# Patient Record
Sex: FEMALE | Race: WHITE | NOT HISPANIC OR LATINO | Employment: OTHER | URBAN - NONMETROPOLITAN AREA
[De-identification: names, ages, dates, MRNs, and addresses within clinical notes are randomized per-mention and may not be internally consistent; named-entity substitution may affect disease eponyms.]

---

## 2017-01-26 ENCOUNTER — TELEPHONE (OUTPATIENT)
Dept: OTOLARYNGOLOGY | Facility: CLINIC | Age: 82
End: 2017-01-26

## 2017-01-26 NOTE — TELEPHONE ENCOUNTER
Spoke with Ms. Treviño re: lost BAHA processor. Processor was fit in 2010 and may be eligible for replacement. Will submit paperwork to [employer] for upgrade.

## 2017-02-06 ENCOUNTER — TELEPHONE (OUTPATIENT)
Dept: OTOLARYNGOLOGY | Facility: CLINIC | Age: 82
End: 2017-02-06

## 2017-02-16 ENCOUNTER — TELEPHONE (OUTPATIENT)
Dept: OTOLARYNGOLOGY | Facility: CLINIC | Age: 82
End: 2017-02-16

## 2017-02-16 NOTE — TELEPHONE ENCOUNTER
Spoke with Ms. Treviño re: BAHA replacement. She needed information on clinic and audiologist to complete paperwork to send to [employer] for replacement processor.

## 2017-03-23 ENCOUNTER — TELEPHONE (OUTPATIENT)
Dept: OTOLARYNGOLOGY | Facility: CLINIC | Age: 82
End: 2017-03-23

## 2017-03-24 NOTE — TELEPHONE ENCOUNTER
Returned call and let her know we are still waiting on Medicare for approval. I will call her when replacement processor arrives.

## 2017-04-18 ENCOUNTER — PROCEDURE VISIT (OUTPATIENT)
Dept: OTOLARYNGOLOGY | Facility: CLINIC | Age: 82
End: 2017-04-18

## 2017-04-18 DIAGNOSIS — IMO0001 ASYMMETRICAL HEARING LOSS, LEFT: Primary | ICD-10-CM

## 2017-04-18 PROCEDURE — HEARINGNOCHG: Performed by: AUDIOLOGIST

## 2017-04-18 NOTE — PROGRESS NOTES
Ms. Treviño presented to the clinic this date for a BAHA fitting. She was fit with a BAHA 5 replacement .

## 2017-05-10 ENCOUNTER — OFFICE VISIT (OUTPATIENT)
Dept: CARDIOLOGY | Facility: CLINIC | Age: 82
End: 2017-05-10

## 2017-05-10 VITALS
HEIGHT: 65 IN | HEART RATE: 68 BPM | OXYGEN SATURATION: 98 % | WEIGHT: 177 LBS | SYSTOLIC BLOOD PRESSURE: 150 MMHG | BODY MASS INDEX: 29.49 KG/M2 | DIASTOLIC BLOOD PRESSURE: 70 MMHG

## 2017-05-10 DIAGNOSIS — I10 ESSENTIAL HYPERTENSION: ICD-10-CM

## 2017-05-10 DIAGNOSIS — E66.09 NON MORBID OBESITY DUE TO EXCESS CALORIES: ICD-10-CM

## 2017-05-10 DIAGNOSIS — R00.2 PALPITATIONS: ICD-10-CM

## 2017-05-10 DIAGNOSIS — I50.32 CHRONIC DIASTOLIC CONGESTIVE HEART FAILURE (HCC): Primary | ICD-10-CM

## 2017-05-10 PROCEDURE — 99214 OFFICE O/P EST MOD 30 MIN: CPT | Performed by: INTERNAL MEDICINE

## 2017-05-10 RX ORDER — CLOTRIMAZOLE AND BETAMETHASONE DIPROPIONATE 10; .64 MG/G; MG/G
1 CREAM TOPICAL AS NEEDED
COMMUNITY
Start: 2017-02-21 | End: 2017-09-18 | Stop reason: ALTCHOICE

## 2017-05-10 RX ORDER — LOSARTAN POTASSIUM 100 MG/1
100 TABLET ORAL DAILY
Qty: 90 TABLET | Refills: 30 | Status: SHIPPED | OUTPATIENT
Start: 2017-05-10 | End: 2018-06-25 | Stop reason: SDUPTHER

## 2017-05-10 RX ORDER — RANITIDINE 300 MG/1
300 TABLET ORAL NIGHTLY
COMMUNITY
Start: 2017-04-26 | End: 2018-09-20 | Stop reason: DRUGHIGH

## 2017-09-18 ENCOUNTER — OFFICE VISIT (OUTPATIENT)
Dept: CARDIOLOGY | Facility: CLINIC | Age: 82
End: 2017-09-18

## 2017-09-18 ENCOUNTER — TELEPHONE (OUTPATIENT)
Dept: CARDIOLOGY | Facility: CLINIC | Age: 82
End: 2017-09-18

## 2017-09-18 VITALS
DIASTOLIC BLOOD PRESSURE: 70 MMHG | BODY MASS INDEX: 28.16 KG/M2 | OXYGEN SATURATION: 98 % | WEIGHT: 169 LBS | SYSTOLIC BLOOD PRESSURE: 140 MMHG | HEIGHT: 65 IN | HEART RATE: 69 BPM

## 2017-09-18 DIAGNOSIS — I50.32 CHRONIC DIASTOLIC CONGESTIVE HEART FAILURE (HCC): ICD-10-CM

## 2017-09-18 DIAGNOSIS — I10 ESSENTIAL HYPERTENSION: ICD-10-CM

## 2017-09-18 DIAGNOSIS — R00.2 HEART PALPITATIONS: Primary | ICD-10-CM

## 2017-09-18 PROCEDURE — 99214 OFFICE O/P EST MOD 30 MIN: CPT | Performed by: PHYSICIAN ASSISTANT

## 2017-09-18 RX ORDER — PROPRANOLOL HYDROCHLORIDE 10 MG/1
10 TABLET ORAL 2 TIMES DAILY
COMMUNITY
Start: 2017-07-18

## 2017-09-18 RX ORDER — SENNOSIDES 8.6 MG
650 CAPSULE ORAL EVERY 8 HOURS PRN
COMMUNITY
End: 2020-10-22

## 2017-09-18 RX ORDER — LINACLOTIDE 72 UG/1
72 CAPSULE, GELATIN COATED ORAL NIGHTLY
COMMUNITY
Start: 2017-09-12 | End: 2020-10-22

## 2017-09-18 RX ORDER — SIMETHICONE 125 MG
1 CAPSULE ORAL EVERY 6 HOURS PRN
COMMUNITY
End: 2020-10-22
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
## 2017-09-26 ENCOUNTER — OUTSIDE FACILITY SERVICE (OUTPATIENT)
Dept: CARDIOLOGY | Facility: CLINIC | Age: 82
End: 2017-09-26

## 2017-09-26 PROCEDURE — 93306 TTE W/DOPPLER COMPLETE: CPT | Performed by: INTERNAL MEDICINE

## 2017-09-27 DIAGNOSIS — R00.2 HEART PALPITATIONS: ICD-10-CM

## 2017-09-27 NOTE — PROGRESS NOTES
Notified patient of echo results with EF 55-60%, grade I diastolic dysfunction, mild MR & TR.  Nothing to explain her palpitations. Await 2 week event monitor results.

## 2017-09-29 ENCOUNTER — TELEPHONE (OUTPATIENT)
Dept: CARDIOLOGY | Facility: CLINIC | Age: 82
End: 2017-09-29

## 2017-09-29 NOTE — TELEPHONE ENCOUNTER
----- Message from Mariya Myles PA-C sent at 9/27/2017 10:28 AM CDT -----  Echo ok.  Please notify pt.  Thanks!      CALLED PT INFORMED OF RESULTS OF ECHO,  SHE VOICED UNDERSTANDING.

## 2017-10-03 ENCOUNTER — TELEPHONE (OUTPATIENT)
Dept: CARDIOLOGY | Facility: CLINIC | Age: 82
End: 2017-10-03

## 2017-10-03 NOTE — TELEPHONE ENCOUNTER
Nae Shar ordered event monitor. Hx PSVT when seeing Dr. Wilson 5 years ago. Was previously treated with propafenone but not now. Not anticoagulated. You have an appt with her next month. I left strips on your desk in [place] - not clear if SVT or afib/flutter.

## 2017-10-03 NOTE — TELEPHONE ENCOUNTER
Franny from [employer] called about her event monitor.  She was notifying you that Ms. Treviño had an EKG alert.  Auto trigger, atrial flutter w/ RVR. Follow up was sinus.  She will be faxing over the report.

## 2017-10-04 NOTE — TELEPHONE ENCOUNTER
I reviewed the strips.  Appears relatively regular, so I doubt a-fib.  Flutter, SVT, or sinus tachycardia are more likely.  I will follow up on the final report and see her in office next month.

## 2017-10-16 ENCOUNTER — TELEPHONE (OUTPATIENT)
Dept: CARDIOLOGY | Facility: CLINIC | Age: 82
End: 2017-10-16

## 2017-10-16 DIAGNOSIS — I48.0 PAROXYSMAL ATRIAL FIBRILLATION (HCC): Primary | ICD-10-CM

## 2017-10-16 RX ORDER — PROPAFENONE HYDROCHLORIDE 225 MG/1
225 CAPSULE, EXTENDED RELEASE ORAL 2 TIMES DAILY
Qty: 60 CAPSULE | Refills: 11 | Status: SHIPPED | OUTPATIENT
Start: 2017-10-16 | End: 2018-08-21 | Stop reason: SDUPTHER

## 2017-10-16 NOTE — TELEPHONE ENCOUNTER
Pt called to inform us,   He pulse is running 145 right now, bp is 125/95,  She states it just makes it hear to breath with her pulse running that high,  She stats that is usually last about an hour, just wanted you to know what was going on.      She states she wore a monitor for 2 weeks and wasn't sure if that caught anything when she had it on. (we dont have result yet).

## 2017-10-16 NOTE — TELEPHONE ENCOUNTER
Will restart propafenone.  Placed a new order.  If her heart rate stays that fast, she will need to go to the ED.

## 2017-10-26 PROCEDURE — 93227 XTRNL ECG REC<48 HR R&I: CPT | Performed by: INTERNAL MEDICINE

## 2017-10-27 DIAGNOSIS — R00.2 HEART PALPITATIONS: ICD-10-CM

## 2017-11-09 ENCOUNTER — OFFICE VISIT (OUTPATIENT)
Dept: CARDIOLOGY | Facility: CLINIC | Age: 82
End: 2017-11-09

## 2017-11-09 VITALS
DIASTOLIC BLOOD PRESSURE: 80 MMHG | BODY MASS INDEX: 28.32 KG/M2 | HEIGHT: 65 IN | SYSTOLIC BLOOD PRESSURE: 138 MMHG | HEART RATE: 72 BPM | WEIGHT: 170 LBS | OXYGEN SATURATION: 95 %

## 2017-11-09 DIAGNOSIS — I10 ESSENTIAL HYPERTENSION: ICD-10-CM

## 2017-11-09 DIAGNOSIS — I50.32 CHRONIC DIASTOLIC CONGESTIVE HEART FAILURE (HCC): ICD-10-CM

## 2017-11-09 DIAGNOSIS — I47.1 PAROXYSMAL ATRIAL TACHYCARDIA (HCC): Primary | ICD-10-CM

## 2017-11-09 DIAGNOSIS — Z00.00 PREVENTATIVE HEALTH CARE: ICD-10-CM

## 2017-11-09 DIAGNOSIS — R00.2 PALPITATIONS: ICD-10-CM

## 2017-11-09 PROCEDURE — 99214 OFFICE O/P EST MOD 30 MIN: CPT | Performed by: INTERNAL MEDICINE

## 2017-11-09 NOTE — PROGRESS NOTES
Reason for Visit: cardiovascular follow up.    HPI:  Sweetie Treviño is a 83 y.o. female is here today for follow-up.  She was last seen in September for evaluation of palpitations.  Event monitor revealed episodes of atrial tachycardia.  She was restarted on her propafenone.  Echocardiogram was checked and demonstrated normal cardiac structure and function which is grade 1 diastolic dysfunction.  Since she restarted the propafenone she has not had any further palpitations or tachycardia.  She needs her pneumonia vaccination and requests a prescription for this.      Previous Cardiac Testing and Procedures:  - Echo (03/30/2016) mild LVH, EF 60%, grade 2 diastolic dysfunction, mild MR and TR, RVSP 39 mmHg  - Holter monitor (03/30/2016) normal sinus rhythm with rare atrial and ventricular ectopic beats  - Event monitor (09/26/2017) episodes of atrial tachycardia up to 163 beats minute, occasional atrial ventricular ectopic beats, symptoms associated with atrial tachycardia.  - Echo (09/26/2017) EF 55-60%, grade 1 diastolic dysfunction, mild MR and TR    Patient Active Problem List   Diagnosis   • Chronic diastolic congestive heart failure   • Essential hypertension   • COPD (chronic obstructive pulmonary disease)   • Non morbid obesity due to excess calories   • GERD (gastroesophageal reflux disease)       Social History   Substance Use Topics   • Smoking status: Former Smoker   • Smokeless tobacco: Never Used   • Alcohol use No       Family History   Problem Relation Age of Onset   • Heart disease Father    • Emphysema Father    • Kidney failure Mother        The following portions of the patient's history were reviewed and updated as appropriate: allergies, current medications, past family history, past medical history, past social history, past surgical history and problem list.      Current Outpatient Prescriptions:   •  acetaminophen (TYLENOL) 650 MG 8 hr tablet, Take 650 mg by mouth Every 8 (Eight) Hours As  Needed for Mild Pain ., Disp: , Rfl:   •  aspirin 81 MG EC tablet, Take 81 mg by mouth Daily., Disp: , Rfl:   •  cholecalciferol (VITAMIN D3) 1000 UNITS tablet, Take 1 tablet by mouth daily.  , Disp: , Rfl:   •  Dextromethorphan-Guaifenesin (MUCINEX DM PO), Take 12 mg by mouth As Needed., Disp: , Rfl:   •  fluticasone (FLONASE) 50 MCG/ACT nasal spray, , Disp: , Rfl:   •  hydrochlorothiazide (HYDRODIURIL) 25 MG tablet, Take 1 tablet by mouth Daily., Disp: 90 tablet, Rfl: 3  •  ketoconazole (NIZORAL) 2 % cream, Apply 2 application topically As Needed., Disp: , Rfl:   •  levothyroxine (SYNTHROID, LEVOTHROID) 125 MCG tablet, Take 125 mcg by mouth Daily., Disp: , Rfl:   •  LINZESS 72 MCG capsule capsule, Take 72 mcg by mouth Every Night., Disp: , Rfl:   •  losartan (COZAAR) 100 MG tablet, Take 1 tablet by mouth Daily., Disp: 90 tablet, Rfl: 30  •  meloxicam (MOBIC) 15 MG tablet, Take 15 mg by mouth Daily., Disp: , Rfl:   •  Omega-3 Fatty Acids (FISH OIL PO), Take  by mouth Daily., Disp: , Rfl:   •  pantoprazole (PROTONIX) 40 MG EC tablet, Take 40 mg by mouth Daily., Disp: , Rfl:   •  propafenone SR (RYTHMOL SR) 225 MG 12 hr capsule, Take 1 capsule by mouth 2 (Two) Times a Day., Disp: 60 capsule, Rfl: 11  •  propranolol (INDERAL) 40 MG tablet, Take 40 mg by mouth 2 (Two) Times a Day., Disp: , Rfl:   •  raNITIdine (ZANTAC) 300 MG tablet, Take 300 mg by mouth Every Night., Disp: , Rfl:   •  simethicone (GAS-X EXTRA STRENGTH) 125 MG capsule capsule, Take 1 capsule by mouth Every 6 (Six) Hours As Needed for Flatulence., Disp: , Rfl:     Current Facility-Administered Medications:   •  pneumococcal conj. 13-valent (PREVNAR-13) vaccine 0.5 mL, 0.5 mL, Intramuscular, Once, Kendrick Higginbotham MD    Review of Systems   Constitution: Negative for chills, fever and malaise/fatigue.   HENT: Negative for congestion and nosebleeds.    Eyes: Positive for double vision. Negative for blurred vision.   Cardiovascular: Positive for leg  "swelling. Negative for chest pain (reportedly rate related), dyspnea on exertion, irregular heartbeat, near-syncope, palpitations, paroxysmal nocturnal dyspnea and syncope.   Respiratory: Negative for cough and shortness of breath.    Endocrine: Negative for cold intolerance and heat intolerance.   Hematologic/Lymphatic: Bruises/bleeds easily.   Skin: Positive for dry skin and itching. Negative for rash.   Musculoskeletal: Positive for joint pain, muscle cramps, neck pain and stiffness. Negative for back pain.   Gastrointestinal: Positive for constipation, hemorrhoids and melena. Negative for abdominal pain and heartburn.   Genitourinary: Positive for bladder incontinence, frequency and nocturia. Negative for dysuria and hematuria.   Neurological: Positive for headaches. Negative for dizziness, light-headedness and numbness.   Psychiatric/Behavioral: Negative for depression. The patient has insomnia. The patient is not nervous/anxious.        Objective   /80 (BP Location: Left arm, Patient Position: Sitting, Cuff Size: Adult)  Pulse 72  Ht 64.5\" (163.8 cm)  Wt 170 lb (77.1 kg)  SpO2 95%  BMI 28.73 kg/m2  Physical Exam   Constitutional: She is oriented to person, place, and time. She appears well-developed and well-nourished.   HENT:   Head: Normocephalic and atraumatic.   Cardiovascular: Normal rate, regular rhythm and normal heart sounds.    No murmur heard.  Pulmonary/Chest: Effort normal and breath sounds normal.   Musculoskeletal: She exhibits no edema.   Neurological: She is alert and oriented to person, place, and time.   Skin: Skin is warm and dry.   Psychiatric: She has a normal mood and affect.     Procedures      ICD-10-CM ICD-9-CM   1. Paroxysmal atrial tachycardia I47.1 427.0   2. Palpitations R00.2 785.1   3. Chronic diastolic congestive heart failure I50.32 428.32     428.0   4. Essential hypertension I10 401.9   5. Preventative health care Z00.00 V70.0         Assessment/Plan:  1.  " Paroxysmal atrial tachycardia: That monitor revealed episodes of atrial tachycardia was regular.  She was restarted on propafenone.  If has further episodes can consider changing propranolol to metoprolol.      2.  Palpitations: Secondary to atrial tachycardia as symptoms correlated with this on event monitor.  Resolved with resuming propafenone.        3. Chronic diastolic congestive heart failure: Remains euvolemic and stable.  Continue PRN furosemide and low sodium diet.       4. Hypertension: Systolic blood pressure is borderline today 138 mmHg. Will continue to monitor.  Bring log to next visit.      5.  Preventative health: Will order pneumonia vaccination as she is due for this.

## 2017-11-21 ENCOUNTER — TELEPHONE (OUTPATIENT)
Dept: OTOLARYNGOLOGY | Facility: CLINIC | Age: 82
End: 2017-11-21

## 2017-11-21 NOTE — TELEPHONE ENCOUNTER
Returned call to Ms. LutherTreviño re: batteries for her BAHA implant. Mailed 4 packs of batteries @ $5.00 per pack. CC info was taken over the phone, receipt enclosed in package.

## 2018-01-18 DIAGNOSIS — I10 ESSENTIAL HYPERTENSION: ICD-10-CM

## 2018-01-18 RX ORDER — HYDROCHLOROTHIAZIDE 25 MG/1
TABLET ORAL
Qty: 90 TABLET | Refills: 2 | Status: SHIPPED | OUTPATIENT
Start: 2018-01-18 | End: 2018-10-13 | Stop reason: SDUPTHER

## 2018-03-15 ENCOUNTER — OFFICE VISIT (OUTPATIENT)
Dept: CARDIOLOGY | Facility: CLINIC | Age: 83
End: 2018-03-15

## 2018-03-15 VITALS
SYSTOLIC BLOOD PRESSURE: 130 MMHG | DIASTOLIC BLOOD PRESSURE: 70 MMHG | WEIGHT: 172 LBS | BODY MASS INDEX: 28.66 KG/M2 | HEIGHT: 65 IN | OXYGEN SATURATION: 97 % | HEART RATE: 63 BPM

## 2018-03-15 DIAGNOSIS — I50.32 CHRONIC DIASTOLIC CONGESTIVE HEART FAILURE (HCC): ICD-10-CM

## 2018-03-15 DIAGNOSIS — I10 ESSENTIAL HYPERTENSION: ICD-10-CM

## 2018-03-15 DIAGNOSIS — I47.1 PAROXYSMAL ATRIAL TACHYCARDIA (HCC): Primary | ICD-10-CM

## 2018-03-15 PROBLEM — G62.9 NEUROPATHY: Status: ACTIVE | Noted: 2018-03-15

## 2018-03-15 PROCEDURE — 99214 OFFICE O/P EST MOD 30 MIN: CPT | Performed by: INTERNAL MEDICINE

## 2018-03-15 PROCEDURE — 93000 ELECTROCARDIOGRAM COMPLETE: CPT | Performed by: INTERNAL MEDICINE

## 2018-03-15 RX ORDER — ALBUTEROL SULFATE 1.25 MG/3ML
1.25 SOLUTION RESPIRATORY (INHALATION) TAKE AS DIRECTED
COMMUNITY
Start: 2018-01-18

## 2018-03-15 RX ORDER — RANITIDINE 150 MG/1
150 TABLET ORAL 2 TIMES DAILY
COMMUNITY
Start: 2018-02-22 | End: 2020-10-22

## 2018-03-15 NOTE — PROGRESS NOTES
Reason for Visit: cardiovascular follow up.    HPI:  Sweetie Treviño is a 83 y.o. female is here today for follow-up.  She has been having balance issues and was diagnosed with neuropathy by Dr Berry.  She brings her blood pressure log and it has fluctuations with some low and some elevated readings.  She has been doing well from a cardiac standpoint.  Denies any palpitations, dizziness, syncope, PND, or orthopnea.  Goes Silver Sneakers 3 times per week.      Previous Cardiac Testing and Procedures:  - Echo (03/30/2016) mild LVH, EF 60%, grade 2 diastolic dysfunction, mild MR and TR, RVSP 39 mmHg  - Holter monitor (03/30/2016) normal sinus rhythm with rare atrial and ventricular ectopic beats  - Event monitor (09/26/2017) episodes of atrial tachycardia up to 163 beats minute, occasional atrial ventricular ectopic beats, symptoms associated with atrial tachycardia.  - Echo (09/26/2017) EF 55-60%, grade 1 diastolic dysfunction, mild MR and TR    Patient Active Problem List   Diagnosis   • Chronic diastolic congestive heart failure   • Essential hypertension   • COPD (chronic obstructive pulmonary disease)   • Non morbid obesity due to excess calories   • GERD (gastroesophageal reflux disease)   • Neuropathy       Social History   Substance Use Topics   • Smoking status: Former Smoker   • Smokeless tobacco: Never Used   • Alcohol use No       Family History   Problem Relation Age of Onset   • Heart disease Father    • Emphysema Father    • Kidney failure Mother        The following portions of the patient's history were reviewed and updated as appropriate: allergies, current medications, past family history, past medical history, past social history, past surgical history and problem list.      Current Outpatient Prescriptions:   •  acetaminophen (TYLENOL) 650 MG 8 hr tablet, Take 650 mg by mouth Every 8 (Eight) Hours As Needed for Mild Pain ., Disp: , Rfl:   •  albuterol (ACCUNEB) 1.25 MG/3ML nebulizer solution,  Take 1.25 mg by nebulization Take As Directed., Disp: , Rfl:   •  aspirin 81 MG EC tablet, Take 81 mg by mouth Daily., Disp: , Rfl:   •  cholecalciferol (VITAMIN D3) 1000 UNITS tablet, Take 1 tablet by mouth daily.  , Disp: , Rfl:   •  Dextromethorphan-Guaifenesin (MUCINEX DM PO), Take 12 mg by mouth As Needed., Disp: , Rfl:   •  fluticasone (FLONASE) 50 MCG/ACT nasal spray, , Disp: , Rfl:   •  hydrochlorothiazide (HYDRODIURIL) 25 MG tablet, TAKE ONE TABLET BY MOUTH DAILY, Disp: 90 tablet, Rfl: 2  •  ketoconazole (NIZORAL) 2 % cream, Apply 2 application topically As Needed., Disp: , Rfl:   •  levothyroxine (SYNTHROID, LEVOTHROID) 125 MCG tablet, Take 125 mcg by mouth Daily., Disp: , Rfl:   •  LINZESS 72 MCG capsule capsule, Take 72 mcg by mouth Every Night., Disp: , Rfl:   •  losartan (COZAAR) 100 MG tablet, Take 1 tablet by mouth Daily., Disp: 90 tablet, Rfl: 30  •  meloxicam (MOBIC) 15 MG tablet, Take 15 mg by mouth Daily., Disp: , Rfl:   •  Omega-3 Fatty Acids (FISH OIL PO), Take  by mouth Daily., Disp: , Rfl:   •  pantoprazole (PROTONIX) 40 MG EC tablet, Take 40 mg by mouth Daily., Disp: , Rfl:   •  propafenone SR (RYTHMOL SR) 225 MG 12 hr capsule, Take 1 capsule by mouth 2 (Two) Times a Day., Disp: 60 capsule, Rfl: 11  •  propranolol (INDERAL) 40 MG tablet, Take 40 mg by mouth 2 (Two) Times a Day., Disp: , Rfl:   •  raNITIdine (ZANTAC) 150 MG tablet, Take 150 mg by mouth 2 (Two) Times a Day., Disp: , Rfl:   •  raNITIdine (ZANTAC) 300 MG tablet, Take 300 mg by mouth Every Night., Disp: , Rfl:   •  simethicone (GAS-X EXTRA STRENGTH) 125 MG capsule capsule, Take 1 capsule by mouth Every 6 (Six) Hours As Needed for Flatulence., Disp: , Rfl:     Review of Systems   Constitution: Negative for chills, fever and malaise/fatigue.   HENT: Negative for congestion and nosebleeds.    Eyes: Positive for double vision. Negative for blurred vision.   Cardiovascular: Positive for irregular heartbeat and leg swelling. Negative  "for chest pain (reportedly rate related), dyspnea on exertion, near-syncope, palpitations, paroxysmal nocturnal dyspnea and syncope.   Respiratory: Negative for cough and shortness of breath.    Endocrine: Negative for cold intolerance and heat intolerance.   Hematologic/Lymphatic: Bruises/bleeds easily.   Skin: Positive for dry skin and itching. Negative for rash.   Musculoskeletal: Positive for joint pain, muscle cramps, neck pain and stiffness. Negative for back pain.   Gastrointestinal: Positive for constipation, hemorrhoids and melena. Negative for abdominal pain and heartburn.   Genitourinary: Positive for bladder incontinence, frequency and nocturia. Negative for dysuria and hematuria.   Neurological: Positive for headaches. Negative for dizziness, light-headedness and numbness.   Psychiatric/Behavioral: Negative for depression. The patient has insomnia. The patient is not nervous/anxious.        Objective   /70 (BP Location: Left arm, Patient Position: Sitting, Cuff Size: Adult)   Pulse 63   Ht 163.8 cm (64.5\")   Wt 78 kg (172 lb)   SpO2 97%   BMI 29.07 kg/m²   Physical Exam   Constitutional: She is oriented to person, place, and time. She appears well-developed and well-nourished.   HENT:   Head: Normocephalic and atraumatic.   Cardiovascular: Normal rate, regular rhythm and normal heart sounds.    No murmur heard.  Pulmonary/Chest: Effort normal and breath sounds normal.   Musculoskeletal: She exhibits no edema.   Neurological: She is alert and oriented to person, place, and time.   Skin: Skin is warm and dry.   Psychiatric: She has a normal mood and affect.       ECG 12 Lead  Date/Time: 3/15/2018 1:18 PM  Performed by: ZOE MARSHALL  Authorized by: ZOE MARSHALL   Comparison: compared with previous ECG from 5/14/2017  Similar to previous ECG  Rhythm: sinus rhythm  Rate: normal  Conduction: 1st degree  Other findings comments: Poor R wave progression  Q waves: III                ICD-10-CM " ICD-9-CM   1. Paroxysmal atrial tachycardia I47.1 427.0   2. Chronic diastolic congestive heart failure I50.32 428.32     428.0   3. Essential hypertension I10 401.9         Assessment/Plan:  1. Paroxysmal atrial tachycardia: Symptoms controlled with propafenone and metoprolol.       2.  Chronic diastolic congestive heart failure: Grade I diastolic function on last echo. Remains euvolemic and stable.  Managed on as needed furosemide and low sodium diet.       3. Hypertension: Blood pressure is reasonably well controlled on current therapy.

## 2018-06-25 DIAGNOSIS — I10 ESSENTIAL HYPERTENSION: ICD-10-CM

## 2018-06-25 RX ORDER — LOSARTAN POTASSIUM 100 MG/1
TABLET ORAL
Qty: 90 TABLET | Refills: 2 | Status: SHIPPED | OUTPATIENT
Start: 2018-06-25 | End: 2019-03-05 | Stop reason: SDUPTHER

## 2018-08-21 DIAGNOSIS — I48.0 PAROXYSMAL ATRIAL FIBRILLATION (HCC): ICD-10-CM

## 2018-08-23 RX ORDER — PROPAFENONE HYDROCHLORIDE 225 MG/1
CAPSULE, EXTENDED RELEASE ORAL
Qty: 180 CAPSULE | Refills: 3 | Status: SHIPPED | OUTPATIENT
Start: 2018-08-23 | End: 2019-07-24 | Stop reason: SDUPTHER

## 2018-09-20 ENCOUNTER — OFFICE VISIT (OUTPATIENT)
Dept: CARDIOLOGY | Facility: CLINIC | Age: 83
End: 2018-09-20

## 2018-09-20 VITALS
HEART RATE: 66 BPM | BODY MASS INDEX: 27.66 KG/M2 | HEIGHT: 65 IN | DIASTOLIC BLOOD PRESSURE: 72 MMHG | WEIGHT: 166 LBS | OXYGEN SATURATION: 96 % | SYSTOLIC BLOOD PRESSURE: 136 MMHG

## 2018-09-20 DIAGNOSIS — I47.1 PAROXYSMAL ATRIAL TACHYCARDIA (HCC): Primary | ICD-10-CM

## 2018-09-20 DIAGNOSIS — I10 ESSENTIAL HYPERTENSION: ICD-10-CM

## 2018-09-20 DIAGNOSIS — I50.32 CHRONIC DIASTOLIC CONGESTIVE HEART FAILURE (HCC): ICD-10-CM

## 2018-09-20 PROBLEM — E78.5 DYSLIPIDEMIA: Status: ACTIVE | Noted: 2018-09-20

## 2018-09-20 PROCEDURE — 93000 ELECTROCARDIOGRAM COMPLETE: CPT | Performed by: INTERNAL MEDICINE

## 2018-09-20 PROCEDURE — 99213 OFFICE O/P EST LOW 20 MIN: CPT | Performed by: INTERNAL MEDICINE

## 2018-09-20 RX ORDER — METRONIDAZOLE 7.5 MG/G
LOTION TOPICAL EVERY 12 HOURS SCHEDULED
COMMUNITY
End: 2020-10-22

## 2018-09-20 RX ORDER — EZETIMIBE 10 MG/1
10 TABLET ORAL DAILY
COMMUNITY
End: 2019-10-03 | Stop reason: ALTCHOICE

## 2018-09-20 NOTE — PROGRESS NOTES
Reason for Visit: cardiovascular follow up.    HPI:  Sweetie Treviño is a 84 y.o. female is here today for follow-up.  She has been doing well from a cardiac standpoint.  Her blood pressures and well controlled.  She has not had any significant tachycardia, palpitations, chest pain, dizziness, syncope, PND, or orthopnea.  Her biggest complaints are her IBS which causes persistent gas.  She also has a tremor and some neuropathy which medication do not seem to be adequately controlling.    Previous Cardiac Testing and Procedures:  - Echo (03/30/2016) mild LVH, EF 60%, grade 2 diastolic dysfunction, mild MR and TR, RVSP 39 mmHg  - Holter monitor (03/30/2016) normal sinus rhythm with rare atrial and ventricular ectopic beats  - Event monitor (09/26/2017) episodes of atrial tachycardia up to 163 beats minute, occasional atrial ventricular ectopic beats, symptoms associated with atrial tachycardia.  - Echo (09/26/2017) EF 55-60%, grade 1 diastolic dysfunction, mild MR and TR    Patient Active Problem List   Diagnosis   • Chronic diastolic congestive heart failure (CMS/HCC)   • Essential hypertension   • COPD (chronic obstructive pulmonary disease) (CMS/HCC)   • GERD (gastroesophageal reflux disease)   • Neuropathy   • Dyslipidemia       Social History   Substance Use Topics   • Smoking status: Former Smoker   • Smokeless tobacco: Never Used   • Alcohol use No       Family History   Problem Relation Age of Onset   • Heart disease Father    • Emphysema Father    • Kidney failure Mother        The following portions of the patient's history were reviewed and updated as appropriate: allergies, current medications, past family history, past medical history, past social history, past surgical history and problem list.      Current Outpatient Prescriptions:   •  acetaminophen (TYLENOL) 650 MG 8 hr tablet, Take 650 mg by mouth Every 8 (Eight) Hours As Needed for Mild Pain ., Disp: , Rfl:   •  albuterol (ACCUNEB) 1.25 MG/3ML  nebulizer solution, Take 1.25 mg by nebulization Take As Directed., Disp: , Rfl:   •  aspirin 81 MG EC tablet, Take 81 mg by mouth Daily., Disp: , Rfl:   •  cholecalciferol (VITAMIN D3) 1000 UNITS tablet, Take 1 tablet by mouth daily.  , Disp: , Rfl:   •  Dextromethorphan-Guaifenesin (MUCINEX DM PO), Take 12 mg by mouth As Needed., Disp: , Rfl:   •  ezetimibe (ZETIA) 10 MG tablet, Take 10 mg by mouth Daily., Disp: , Rfl:   •  fluticasone (FLONASE) 50 MCG/ACT nasal spray, , Disp: , Rfl:   •  hydrochlorothiazide (HYDRODIURIL) 25 MG tablet, TAKE ONE TABLET BY MOUTH DAILY, Disp: 90 tablet, Rfl: 2  •  levothyroxine (SYNTHROID, LEVOTHROID) 125 MCG tablet, Take 125 mcg by mouth Daily., Disp: , Rfl:   •  LINZESS 72 MCG capsule capsule, Take 72 mcg by mouth Every Night., Disp: , Rfl:   •  losartan (COZAAR) 100 MG tablet, TAKE ONE TABLET BY MOUTH DAILY, Disp: 90 tablet, Rfl: 2  •  meloxicam (MOBIC) 15 MG tablet, Take 15 mg by mouth Daily., Disp: , Rfl:   •  metroNIDAZOLE (FLAGYL) 0.75 % lotion lotion, Apply  topically to the appropriate area as directed Every 12 (Twelve) Hours., Disp: , Rfl:   •  pantoprazole (PROTONIX) 40 MG EC tablet, Take 40 mg by mouth Daily., Disp: , Rfl:   •  propafenone SR (RYTHMOL SR) 225 MG 12 hr capsule, TAKE ONE CAPSULE BY MOUTH TWICE A DAY, Disp: 180 capsule, Rfl: 3  •  propranolol (INDERAL) 10 MG tablet, Take 10 mg by mouth 2 (Two) Times a Day., Disp: , Rfl:   •  raNITIdine (ZANTAC) 150 MG tablet, Take 150 mg by mouth 2 (Two) Times a Day., Disp: , Rfl:   •  simethicone (GAS-X EXTRA STRENGTH) 125 MG capsule capsule, Take 1 capsule by mouth Every 6 (Six) Hours As Needed for Flatulence., Disp: , Rfl:   •  ketoconazole (NIZORAL) 2 % cream, Apply 2 application topically As Needed., Disp: , Rfl:     Review of Systems   Constitution: Negative for chills and fever.   Cardiovascular: Negative for chest pain and paroxysmal nocturnal dyspnea.   Respiratory: Negative for cough and shortness of breath.   "  Skin: Negative for rash.   Gastrointestinal: Positive for bloating and flatus. Negative for abdominal pain and heartburn.   Neurological: Positive for tremors. Negative for dizziness and numbness.       Objective   /72 (BP Location: Right arm, Patient Position: Sitting, Cuff Size: Adult)   Pulse 66   Ht 163.8 cm (64.5\")   Wt 75.3 kg (166 lb)   SpO2 96%   BMI 28.05 kg/m²   Physical Exam   Constitutional: She is oriented to person, place, and time. She appears well-developed and well-nourished.   HENT:   Head: Normocephalic and atraumatic.   Cardiovascular: Normal rate, regular rhythm and normal heart sounds.    No murmur heard.  Pulmonary/Chest: Effort normal and breath sounds normal.   Musculoskeletal: She exhibits no edema.   Neurological: She is alert and oriented to person, place, and time. She displays tremor.   Skin: Skin is warm and dry.   Psychiatric: She has a normal mood and affect.       ECG 12 Lead  Date/Time: 9/20/2018 11:43 AM  Performed by: ZOE MARSHALL  Authorized by: ZOE MARSHALL   Comparison: compared with previous ECG from 3/15/2018  Similar to previous ECG  Rhythm: sinus rhythm  Rate: normal  Other findings comments: Poor R wave progression  Q waves: III                ICD-10-CM ICD-9-CM   1. Paroxysmal atrial tachycardia (CMS/HCC) I47.1 427.0   2. Chronic diastolic congestive heart failure (CMS/HCC) I50.32 428.32     428.0   3. Essential hypertension I10 401.9         Assessment/Plan:  1. Paroxysmal atrial tachycardia: Currently asymptomatic.  Continue therapy with propafenone and  propranolol.       2.  Chronic diastolic congestive heart failure: Grade I diastolic function on previous echo from 9/2017.  Euvolemic and stable on exam.  Continue PRN furosemide and low sodium diet.       3. Hypertension: Blood pressure remains reasonably well controlled on current medications.    "

## 2018-10-13 DIAGNOSIS — I10 ESSENTIAL HYPERTENSION: ICD-10-CM

## 2018-10-15 RX ORDER — HYDROCHLOROTHIAZIDE 25 MG/1
TABLET ORAL
Qty: 90 TABLET | Refills: 3 | Status: SHIPPED | OUTPATIENT
Start: 2018-10-15 | End: 2019-09-26 | Stop reason: SDUPTHER

## 2019-03-05 DIAGNOSIS — I10 ESSENTIAL HYPERTENSION: ICD-10-CM

## 2019-03-06 RX ORDER — LOSARTAN POTASSIUM 100 MG/1
TABLET ORAL
Qty: 90 TABLET | Refills: 1 | Status: SHIPPED | OUTPATIENT
Start: 2019-03-06 | End: 2020-10-22 | Stop reason: ALTCHOICE

## 2019-07-24 DIAGNOSIS — I48.0 PAROXYSMAL ATRIAL FIBRILLATION (HCC): ICD-10-CM

## 2019-07-24 RX ORDER — PROPAFENONE HYDROCHLORIDE 225 MG/1
225 CAPSULE, EXTENDED RELEASE ORAL 2 TIMES DAILY
Qty: 180 CAPSULE | Refills: 0 | Status: SHIPPED | OUTPATIENT
Start: 2019-07-24 | End: 2019-10-24 | Stop reason: SDUPTHER

## 2019-09-26 DIAGNOSIS — I10 ESSENTIAL HYPERTENSION: ICD-10-CM

## 2019-09-26 RX ORDER — HYDROCHLOROTHIAZIDE 25 MG/1
25 TABLET ORAL DAILY
Qty: 90 TABLET | Refills: 3 | Status: SHIPPED | OUTPATIENT
Start: 2019-09-26 | End: 2021-10-21

## 2019-10-03 ENCOUNTER — OFFICE VISIT (OUTPATIENT)
Dept: CARDIOLOGY | Facility: CLINIC | Age: 84
End: 2019-10-03

## 2019-10-03 VITALS
OXYGEN SATURATION: 99 % | WEIGHT: 177 LBS | SYSTOLIC BLOOD PRESSURE: 110 MMHG | HEART RATE: 75 BPM | BODY MASS INDEX: 31.36 KG/M2 | HEIGHT: 63 IN | DIASTOLIC BLOOD PRESSURE: 60 MMHG

## 2019-10-03 DIAGNOSIS — I50.32 CHRONIC DIASTOLIC CONGESTIVE HEART FAILURE (HCC): ICD-10-CM

## 2019-10-03 DIAGNOSIS — I47.1 PAROXYSMAL ATRIAL TACHYCARDIA (HCC): Primary | ICD-10-CM

## 2019-10-03 DIAGNOSIS — E66.09 CLASS 1 OBESITY DUE TO EXCESS CALORIES WITH SERIOUS COMORBIDITY AND BODY MASS INDEX (BMI) OF 31.0 TO 31.9 IN ADULT: ICD-10-CM

## 2019-10-03 DIAGNOSIS — I10 ESSENTIAL HYPERTENSION: ICD-10-CM

## 2019-10-03 PROBLEM — I47.19 PAROXYSMAL ATRIAL TACHYCARDIA: Status: ACTIVE | Noted: 2019-10-03

## 2019-10-03 PROCEDURE — 99214 OFFICE O/P EST MOD 30 MIN: CPT | Performed by: INTERNAL MEDICINE

## 2019-10-03 PROCEDURE — 93000 ELECTROCARDIOGRAM COMPLETE: CPT | Performed by: INTERNAL MEDICINE

## 2019-10-03 RX ORDER — VALACYCLOVIR HYDROCHLORIDE 1 G/1
1000 TABLET, FILM COATED ORAL 3 TIMES DAILY
Refills: 0 | COMMUNITY
Start: 2019-09-29 | End: 2020-10-22

## 2019-10-03 RX ORDER — ROSUVASTATIN CALCIUM 10 MG/1
10 TABLET, COATED ORAL NIGHTLY
Refills: 1 | COMMUNITY
Start: 2019-09-27

## 2019-10-03 NOTE — PROGRESS NOTES
Reason for Visit: cardiovascular follow up.    HPI:  Sweetie Treviño is a 85 y.o. female is here today for 1 year follow-up.  She has been doing well from a cardiac standpoint.  She denies any chest pain, palpitations, dizziness, syncope, PND, or orthopnea.  She has been having problems with her bladder stimulator.  Has not been able to empty her bladder and is going to the bathroom frequently.  She has some numbness in her legs on occasion.     Previous Cardiac Testing and Procedures:  - Echo (03/30/2016) mild LVH, EF 60%, grade 2 diastolic dysfunction, mild MR and TR, RVSP 39 mmHg  - Holter monitor (03/30/2016) normal sinus rhythm with rare atrial and ventricular ectopic beats  - Event monitor (09/26/2017) episodes of atrial tachycardia up to 163 beats minute, occasional atrial ventricular ectopic beats, symptoms associated with atrial tachycardia.  - Echo (09/26/2017) EF 55-60%, grade 1 diastolic dysfunction, mild MR and TR    Patient Active Problem List   Diagnosis   • Chronic diastolic congestive heart failure (CMS/HCC)   • Essential hypertension   • COPD (chronic obstructive pulmonary disease) (CMS/HCC)   • GERD (gastroesophageal reflux disease)   • Neuropathy   • Dyslipidemia   • Paroxysmal atrial tachycardia (CMS/HCC)       Social History     Tobacco Use   • Smoking status: Former Smoker   • Smokeless tobacco: Never Used   Substance Use Topics   • Alcohol use: No   • Drug use: No       Family History   Problem Relation Age of Onset   • Heart disease Father    • Emphysema Father    • Kidney failure Mother        The following portions of the patient's history were reviewed and updated as appropriate: allergies, current medications, past family history, past medical history, past social history, past surgical history and problem list.      Current Outpatient Medications:   •  acetaminophen (TYLENOL) 650 MG 8 hr tablet, Take 650 mg by mouth Every 8 (Eight) Hours As Needed for Mild Pain ., Disp: , Rfl:   •   albuterol (ACCUNEB) 1.25 MG/3ML nebulizer solution, Take 1.25 mg by nebulization Take As Directed., Disp: , Rfl:   •  cholecalciferol (VITAMIN D3) 1000 UNITS tablet, Take 1 tablet by mouth daily.  , Disp: , Rfl:   •  Dextromethorphan-Guaifenesin (MUCINEX DM PO), Take 12 mg by mouth As Needed., Disp: , Rfl:   •  fluticasone (FLONASE) 50 MCG/ACT nasal spray, , Disp: , Rfl:   •  hydroCHLOROthiazide (HYDRODIURIL) 25 MG tablet, Take 1 tablet by mouth Daily., Disp: 90 tablet, Rfl: 3  •  ketoconazole (NIZORAL) 2 % cream, Apply 2 application topically As Needed., Disp: , Rfl:   •  levothyroxine (SYNTHROID, LEVOTHROID) 125 MCG tablet, Take 125 mcg by mouth Daily., Disp: , Rfl:   •  LINZESS 72 MCG capsule capsule, Take 72 mcg by mouth Every Night., Disp: , Rfl:   •  losartan (COZAAR) 100 MG tablet, TAKE ONE TABLET BY MOUTH DAILY, Disp: 90 tablet, Rfl: 1  •  meloxicam (MOBIC) 15 MG tablet, Take 15 mg by mouth Daily., Disp: , Rfl:   •  metroNIDAZOLE (FLAGYL) 0.75 % lotion lotion, Apply  topically to the appropriate area as directed Every 12 (Twelve) Hours., Disp: , Rfl:   •  pantoprazole (PROTONIX) 40 MG EC tablet, Take 40 mg by mouth Daily., Disp: , Rfl:   •  propafenone SR (RYTHMOL SR) 225 MG 12 hr capsule, Take 1 capsule by mouth 2 (Two) Times a Day., Disp: 180 capsule, Rfl: 0  •  propranolol (INDERAL) 10 MG tablet, Take 10 mg by mouth 2 (Two) Times a Day., Disp: , Rfl:   •  raNITIdine (ZANTAC) 150 MG tablet, Take 150 mg by mouth 2 (Two) Times a Day., Disp: , Rfl:   •  rosuvastatin (CRESTOR) 10 MG tablet, Take 10 mg by mouth Every Night., Disp: , Rfl: 1  •  simethicone (GAS-X EXTRA STRENGTH) 125 MG capsule capsule, Take 1 capsule by mouth Every 6 (Six) Hours As Needed for Flatulence., Disp: , Rfl:   •  valACYclovir (VALTREX) 1000 MG tablet, Take 1,000 mg by mouth 3 (Three) Times a Day., Disp: , Rfl: 0  •  aspirin 81 MG EC tablet, Take 81 mg by mouth Daily., Disp: , Rfl:     Review of Systems   Constitution: Negative for  "chills and fever.   Cardiovascular: Negative for chest pain and paroxysmal nocturnal dyspnea.   Respiratory: Negative for cough and shortness of breath.    Skin: Negative for rash.   Gastrointestinal: Negative for abdominal pain and heartburn.   Genitourinary: Positive for nocturia and urgency.   Neurological: Negative for dizziness and numbness.       Objective   /60 (BP Location: Left arm, Patient Position: Sitting, Cuff Size: Adult)   Pulse 75   Ht 160 cm (63\")   Wt 80.3 kg (177 lb)   SpO2 99%   BMI 31.35 kg/m²   Physical Exam   Constitutional: She is oriented to person, place, and time. She appears well-developed and well-nourished.   HENT:   Head: Normocephalic and atraumatic.   Cardiovascular: Normal rate, regular rhythm and normal heart sounds.   No murmur heard.  Pulmonary/Chest: Effort normal and breath sounds normal.   Abdominal: She exhibits distension (obese).   Musculoskeletal: She exhibits no edema.   Neurological: She is alert and oriented to person, place, and time.   Skin: Skin is warm and dry.   Psychiatric: She has a normal mood and affect.       ECG 12 Lead  Date/Time: 10/3/2019 3:03 PM  Performed by: Kendrick Higginbotham MD  Authorized by: Kendrick Higginbotham MD   Comparison: compared with previous ECG from 9/20/2018  Similar to previous ECG  Rhythm: sinus rhythm  Rate: normal  Other findings: non-specific ST-T wave changes              ICD-10-CM ICD-9-CM   1. Paroxysmal atrial tachycardia (CMS/HCC) I47.1 427.0   2. Chronic diastolic congestive heart failure (CMS/HCC) I50.32 428.32     428.0   3. Essential hypertension I10 401.9   4. Class 1 obesity due to excess calories with serious comorbidity and body mass index (BMI) of 31.0 to 31.9 in adult E66.09 278.00    Z68.31 V85.31         Assessment/Plan:  1. Paroxysmal atrial tachycardia: In sinus rhythm on EKG  Continue rhythm control with propafenone and propranolol.       2.  Chronic diastolic congestive heart failure: Grade I diastolic " function on previous echo from 9/2017.  Remains stable.  Continue furosemide as needed.         3.  Hypertension: Blood pressure remains well controlled on current therapy.      4.  Obesity: Patient's Body mass index is 31.35 kg/m². BMI is above normal parameters. Recommendations include: exercise counseling and nutrition counseling.

## 2019-10-24 DIAGNOSIS — I48.0 PAROXYSMAL ATRIAL FIBRILLATION (HCC): ICD-10-CM

## 2019-10-25 RX ORDER — PROPAFENONE HYDROCHLORIDE 225 MG/1
225 CAPSULE, EXTENDED RELEASE ORAL 2 TIMES DAILY
Qty: 180 CAPSULE | Refills: 3 | Status: SHIPPED | OUTPATIENT
Start: 2019-10-25

## 2020-06-09 ENCOUNTER — OUTSIDE FACILITY SERVICE (OUTPATIENT)
Dept: CARDIOLOGY | Facility: CLINIC | Age: 85
End: 2020-06-09

## 2020-06-09 PROCEDURE — 93306 TTE W/DOPPLER COMPLETE: CPT | Performed by: INTERNAL MEDICINE

## 2020-10-22 ENCOUNTER — OFFICE VISIT (OUTPATIENT)
Dept: CARDIOLOGY | Facility: CLINIC | Age: 85
End: 2020-10-22

## 2020-10-22 VITALS
DIASTOLIC BLOOD PRESSURE: 84 MMHG | WEIGHT: 181 LBS | BODY MASS INDEX: 32.06 KG/M2 | HEART RATE: 73 BPM | OXYGEN SATURATION: 95 % | SYSTOLIC BLOOD PRESSURE: 120 MMHG

## 2020-10-22 DIAGNOSIS — I10 ESSENTIAL HYPERTENSION: ICD-10-CM

## 2020-10-22 DIAGNOSIS — E66.09 CLASS 1 OBESITY DUE TO EXCESS CALORIES WITH SERIOUS COMORBIDITY AND BODY MASS INDEX (BMI) OF 32.0 TO 32.9 IN ADULT: ICD-10-CM

## 2020-10-22 DIAGNOSIS — E78.5 DYSLIPIDEMIA: ICD-10-CM

## 2020-10-22 DIAGNOSIS — I50.32 CHRONIC DIASTOLIC CONGESTIVE HEART FAILURE (HCC): ICD-10-CM

## 2020-10-22 DIAGNOSIS — I47.1 PAROXYSMAL ATRIAL TACHYCARDIA (HCC): Primary | ICD-10-CM

## 2020-10-22 PROBLEM — Z87.828 HISTORY OF TRAUMATIC INJURY OF HEAD: Status: ACTIVE | Noted: 2020-10-22

## 2020-10-22 PROCEDURE — 99214 OFFICE O/P EST MOD 30 MIN: CPT | Performed by: INTERNAL MEDICINE

## 2020-10-22 PROCEDURE — 93000 ELECTROCARDIOGRAM COMPLETE: CPT | Performed by: INTERNAL MEDICINE

## 2020-10-22 RX ORDER — CLOTRIMAZOLE AND BETAMETHASONE DIPROPIONATE 10; .5 MG/ML; MG/ML
LOTION TOPICAL 2 TIMES DAILY
COMMUNITY

## 2020-10-22 RX ORDER — UBIDECARENONE 100 MG
100 CAPSULE ORAL DAILY
COMMUNITY

## 2020-10-22 RX ORDER — EZETIMIBE 10 MG/1
10 TABLET ORAL DAILY
COMMUNITY
End: 2021-10-21

## 2020-10-22 RX ORDER — LOSARTAN POTASSIUM AND HYDROCHLOROTHIAZIDE 25; 100 MG/1; MG/1
1 TABLET ORAL DAILY
COMMUNITY

## 2020-10-22 RX ORDER — DIPHENOXYLATE HYDROCHLORIDE AND ATROPINE SULFATE 2.5; .025 MG/1; MG/1
TABLET ORAL DAILY
COMMUNITY

## 2020-10-22 NOTE — PROGRESS NOTES
Reason for Visit: cardiovascular follow up.    HPI:  Sweetie Treviño is a 86 y.o. female is here today for 1 year follow-up.  She had a repeat echocardiogram done at her primary care office in January which was ordered by Dr. Lopez due to hypertension, chronic atrial fibrillation, PVCs, and CHF.  She has been doing well and not having any issues or complaints.  She will occasionally have an irregular heart beat at home but denies any significant palpitations.  She also denies any chest pain, dizziness, syncope, PND, or orthopnea.      Previous Cardiac Testing and Procedures:  - Echo (03/30/2016) mild LVH, EF 60%, grade 2 diastolic dysfunction, mild MR and TR, RVSP 39 mmHg  - Holter monitor (03/30/2016) normal sinus rhythm with rare atrial and ventricular ectopic beats  - Event monitor (09/26/2017) episodes of atrial tachycardia up to 163 beats minute, occasional atrial ventricular ectopic beats, symptoms associated with atrial tachycardia.  - Echo (09/26/2017) EF 55-60%, grade 1 diastolic dysfunction, mild MR and TR  - Echo (6/9/2020) EF 65%, stage I diastolic dysfunction, mild PI    Patient Active Problem List   Diagnosis   • Chronic diastolic congestive heart failure (CMS/HCC)   • Essential hypertension   • COPD (chronic obstructive pulmonary disease) (CMS/HCC)   • Class 1 obesity due to excess calories with serious comorbidity and body mass index (BMI) of 32.0 to 32.9 in adult   • GERD (gastroesophageal reflux disease)   • Neuropathy   • Dyslipidemia   • Paroxysmal atrial tachycardia (CMS/HCC)   • History of traumatic injury of head       Social History     Tobacco Use   • Smoking status: Former Smoker   • Smokeless tobacco: Never Used   Substance Use Topics   • Alcohol use: No   • Drug use: No       Family History   Problem Relation Age of Onset   • Heart disease Father    • Emphysema Father    • Kidney failure Mother        The following portions of the patient's history were reviewed and updated as  appropriate: allergies, current medications, past family history, past medical history, past social history, past surgical history and problem list.      Current Outpatient Medications:   •  albuterol (ACCUNEB) 1.25 MG/3ML nebulizer solution, Take 1.25 mg by nebulization Take As Directed., Disp: , Rfl:   •  Calcium Carb-Cholecalciferol (CALCIUM 500+D PO), Take  by mouth., Disp: , Rfl:   •  coenzyme Q10 100 MG capsule, Take 100 mg by mouth Daily., Disp: , Rfl:   •  ezetimibe (ZETIA) 10 MG tablet, Take 10 mg by mouth Daily., Disp: , Rfl:   •  hydroCHLOROthiazide (HYDRODIURIL) 25 MG tablet, Take 1 tablet by mouth Daily., Disp: 90 tablet, Rfl: 3  •  levothyroxine (SYNTHROID, LEVOTHROID) 125 MCG tablet, Take 125 mcg by mouth Daily., Disp: , Rfl:   •  losartan-hydrochlorothiazide (HYZAAR) 100-25 MG per tablet, Take 1 tablet by mouth Daily., Disp: , Rfl:   •  meloxicam (MOBIC) 15 MG tablet, Take 15 mg by mouth Daily., Disp: , Rfl:   •  multivitamin (MULTI-DAY VITAMINS PO), Take  by mouth Daily., Disp: , Rfl:   •  NON FORMULARY, [employer], Disp: , Rfl:   •  pantoprazole (PROTONIX) 40 MG EC tablet, Take 40 mg by mouth Daily., Disp: , Rfl:   •  propafenone SR (RYTHMOL SR) 225 MG 12 hr capsule, Take 1 capsule by mouth 2 (Two) Times a Day., Disp: 180 capsule, Rfl: 3  •  propranolol (INDERAL) 10 MG tablet, Take 10 mg by mouth 2 (Two) Times a Day., Disp: , Rfl:   •  rosuvastatin (CRESTOR) 10 MG tablet, Take 10 mg by mouth Every Night., Disp: , Rfl: 1    Review of Systems   Constitution: Negative for chills and fever.   HENT: Positive for hearing loss.    Cardiovascular: Positive for irregular heartbeat. Negative for chest pain, palpitations and paroxysmal nocturnal dyspnea.   Respiratory: Negative for cough and shortness of breath.    Skin: Negative for rash.   Musculoskeletal: Positive for joint pain.   Gastrointestinal: Negative for abdominal pain and heartburn.   Neurological: Positive for tremors. Negative for  dizziness and numbness.       Objective   /84 (BP Location: Left arm, Patient Position: Sitting, Cuff Size: Adult)   Pulse 73   Wt 82.1 kg (181 lb)   SpO2 95%   BMI 32.06 kg/m²   Constitutional:       Appearance: Well-developed.   HENT:      Head: Normocephalic and atraumatic.   Pulmonary:      Effort: Pulmonary effort is normal.      Breath sounds: Normal breath sounds.   Cardiovascular:      Normal rate. Regular rhythm.      Murmurs: There is no murmur.      No gallop. No click.   Edema:     Peripheral edema absent.   Skin:     General: Skin is warm and dry.   Neurological:      Mental Status: Alert and oriented to person, place, and time.         ECG 12 Lead    Date/Time: 10/22/2020 1:25 PM  Performed by: Kendrick Higginbotham MD  Authorized by: Kendrick Higginbotham MD   Comparison: compared with previous ECG from 10/3/2019  Similar to previous ECG  Rhythm: sinus rhythm  Rate: normal  Conduction: 1st degree AV block  Other findings: non-specific ST-T wave changes              ICD-10-CM ICD-9-CM   1. Paroxysmal atrial tachycardia (CMS/HCC)  I47.1 427.0   2. Chronic diastolic congestive heart failure (CMS/HCC)  I50.32 428.32     428.0   3. Essential hypertension  I10 401.9   4. Class 1 obesity due to excess calories with serious comorbidity and body mass index (BMI) of 32.0 to 32.9 in adult  E66.09 278.00    Z68.32 V85.32   5. Dyslipidemia  E78.5 272.4         Assessment/Plan:  1. Paroxysmal atrial tachycardia: Remains sinus rhythm on EKG with no significant symptoms.  Managed on propafenone and propranolol.       2.  Chronic diastolic congestive heart failure: Grade I diastolic function on previous echo from 9/2017.  She remains euvolemic and stable.  Encourage low sodium diet.        3.  Essential hypertension: Blood pressure remains well controlled on current therapy.       4.  Obesity: Patient's Body mass index is 32.06 kg/m². BMI is above normal parameters. Recommendations include: exercise counseling and  nutrition counseling.     5.  Dyslipidemia: Managed on rosuvastatin.

## 2021-10-21 ENCOUNTER — OFFICE VISIT (OUTPATIENT)
Dept: CARDIOLOGY | Facility: CLINIC | Age: 86
End: 2021-10-21

## 2021-10-21 VITALS
DIASTOLIC BLOOD PRESSURE: 80 MMHG | HEIGHT: 64 IN | OXYGEN SATURATION: 93 % | HEART RATE: 76 BPM | BODY MASS INDEX: 29.37 KG/M2 | SYSTOLIC BLOOD PRESSURE: 120 MMHG | WEIGHT: 172 LBS

## 2021-10-21 DIAGNOSIS — I50.32 CHRONIC DIASTOLIC CONGESTIVE HEART FAILURE (HCC): ICD-10-CM

## 2021-10-21 DIAGNOSIS — E66.09 CLASS 1 OBESITY DUE TO EXCESS CALORIES WITH SERIOUS COMORBIDITY AND BODY MASS INDEX (BMI) OF 32.0 TO 32.9 IN ADULT: ICD-10-CM

## 2021-10-21 DIAGNOSIS — E78.5 DYSLIPIDEMIA: ICD-10-CM

## 2021-10-21 DIAGNOSIS — I10 ESSENTIAL HYPERTENSION: ICD-10-CM

## 2021-10-21 DIAGNOSIS — I47.1 PAROXYSMAL ATRIAL TACHYCARDIA (HCC): Primary | ICD-10-CM

## 2021-10-21 PROCEDURE — 99214 OFFICE O/P EST MOD 30 MIN: CPT | Performed by: INTERNAL MEDICINE

## 2021-10-21 NOTE — PROGRESS NOTES
Reason for Visit: cardiovascular follow up.    HPI:  Sweetie Treviño is a 87 y.o. female is here today for 1 year follow-up.  She is doing well from a cardiac standpoint.  Her main complaint today is neuropathy.  She denies any angina, palpitations, dizziness, syncope, PND, or orthopnea.  Her blood pressure has been well controlled.  She walks with a walker.  Taking care of her dogs keeps her active.  Her weight is down 9 lbs compared to last visit.      Previous Cardiac Testing and Procedures:  - Echo (03/30/2016) mild LVH, EF 60%, grade 2 diastolic dysfunction, mild MR and TR, RVSP 39 mmHg  - Holter monitor (03/30/2016) normal sinus rhythm with rare atrial and ventricular ectopic beats  - Event monitor (09/26/2017) episodes of atrial tachycardia up to 163 beats minute, occasional atrial ventricular ectopic beats, symptoms associated with atrial tachycardia.  - Echo (09/26/2017) EF 55-60%, grade 1 diastolic dysfunction, mild MR and TR  - Echo (6/9/2020) EF 65%, stage I diastolic dysfunction, mild PI    Patient Active Problem List   Diagnosis   • Chronic diastolic congestive heart failure (HCC)   • Essential hypertension   • COPD (chronic obstructive pulmonary disease) (HCC)   • Class 1 obesity due to excess calories with serious comorbidity and body mass index (BMI) of 32.0 to 32.9 in adult   • GERD (gastroesophageal reflux disease)   • Neuropathy   • Dyslipidemia   • Paroxysmal atrial tachycardia (HCC)   • History of traumatic injury of head       Social History     Tobacco Use   • Smoking status: Former Smoker   • Smokeless tobacco: Never Used   Substance Use Topics   • Alcohol use: No   • Drug use: No       Family History   Problem Relation Age of Onset   • Heart disease Father    • Emphysema Father    • Kidney failure Mother        The following portions of the patient's history were reviewed and updated as appropriate: allergies, current medications, past family history, past medical history, past social  history, past surgical history and problem list.      Current Outpatient Medications:   •  albuterol (ACCUNEB) 1.25 MG/3ML nebulizer solution, Take 1.25 mg by nebulization Take As Directed., Disp: , Rfl:   •  Calcium Carb-Cholecalciferol (CALCIUM 500+D PO), Take  by mouth., Disp: , Rfl:   •  clotrimazole-betamethasone (LOTRISONE) 1-0.05 % lotion, Apply  topically to the appropriate area as directed 2 (Two) Times a Day., Disp: , Rfl:   •  coenzyme Q10 100 MG capsule, Take 100 mg by mouth Daily., Disp: , Rfl:   •  levothyroxine (SYNTHROID, LEVOTHROID) 125 MCG tablet, Take 125 mcg by mouth Daily., Disp: , Rfl:   •  linaclotide (Linzess) 72 MCG capsule capsule, Take 72 mcg by mouth Every Morning Before Breakfast., Disp: , Rfl:   •  losartan-hydrochlorothiazide (HYZAAR) 100-25 MG per tablet, Take 1 tablet by mouth Daily., Disp: , Rfl:   •  meloxicam (MOBIC) 15 MG tablet, Take 15 mg by mouth Daily., Disp: , Rfl:   •  Multiple Vitamins-Minerals (VITAMIN D3 COMPLETE PO), Take  by mouth., Disp: , Rfl:   •  multivitamin (MULTI-DAY VITAMINS PO), Take  by mouth Daily., Disp: , Rfl:   •  NON FORMULARY, [facility] [employer], Disp: , Rfl:   •  pantoprazole (PROTONIX) 40 MG EC tablet, Take 40 mg by mouth Daily., Disp: , Rfl:   •  propafenone SR (RYTHMOL SR) 225 MG 12 hr capsule, Take 1 capsule by mouth 2 (Two) Times a Day., Disp: 180 capsule, Rfl: 3  •  propranolol (INDERAL) 10 MG tablet, Take 10 mg by mouth 2 (Two) Times a Day., Disp: , Rfl:   •  rosuvastatin (CRESTOR) 10 MG tablet, Take 10 mg by mouth Every Night., Disp: , Rfl: 1    Review of Systems   Constitutional: Negative for chills and fever.   HENT: Positive for hearing loss.    Cardiovascular: Negative for chest pain and paroxysmal nocturnal dyspnea.   Respiratory: Negative for cough and shortness of breath.    Skin: Negative for rash.   Gastrointestinal: Negative for abdominal pain and heartburn.   Neurological: Positive for disturbances in coordination, numbness and  "paresthesias. Negative for dizziness.       Objective   /80 (BP Location: Left arm, Patient Position: Sitting, Cuff Size: Adult)   Pulse 76   Ht 162.6 cm (64\")   Wt 78 kg (172 lb)   SpO2 93%   BMI 29.52 kg/m²   Constitutional:       Appearance: Well-developed.   HENT:      Head: Normocephalic and atraumatic.      Right Ear: Decreased hearing noted.      Left Ear: Decreased hearing noted.   Pulmonary:      Effort: Pulmonary effort is normal.      Breath sounds: Normal breath sounds.   Cardiovascular:      Normal rate. Regular rhythm.      Murmurs: There is no murmur.      No gallop. No click.   Skin:     General: Skin is warm and dry.   Neurological:      Mental Status: Alert and oriented to person, place, and time.       Procedures      ICD-10-CM ICD-9-CM   1. Paroxysmal atrial tachycardia (HCC)  I47.1 427.0   2. Chronic diastolic congestive heart failure (HCC)  I50.32 428.32     428.0   3. Essential hypertension  I10 401.9   4. Class 1 obesity due to excess calories with serious comorbidity and body mass index (BMI) of 32.0 to 32.9 in adult  E66.09 278.00    Z68.32 V85.32   5. Dyslipidemia  E78.5 272.4         Assessment/Plan:  1.  Paroxysmal atrial tachycardia: Regular rhythm on exam with no significant symptoms.  Continue propafenone and propranolol.       2.  Chronic diastolic congestive heart failure: Grade I diastolic function on echo from 9/2017.  Euvolemic and stable on exam.        3.  Essential hypertension: Blood pressure remains well controlled.  Continue losartan, propranolol, and HCTZ.       4. Overweight: Patient's Body mass index is 29.52 kg/m². indicating that she is overweigh (BMI >30). Obesity-related health conditions include the following: hypertension and dyslipidemias. Obesity is improving with lifestyle modifications. BMI is is above average; BMI management plan is completed. We discussed portion control and increasing exercise..      5.  Dyslipidemia: Continue rosuvastatin.  "
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
## 2022-03-15 ENCOUNTER — OFFICE VISIT (OUTPATIENT)
Dept: OTOLARYNGOLOGY | Facility: CLINIC | Age: 87
End: 2022-03-15

## 2022-03-15 DIAGNOSIS — Z96.21 STATUS POST PLACEMENT OF BONE ANCHORED HEARING AID (BAHA): Primary | ICD-10-CM

## 2022-03-15 DIAGNOSIS — H91.93 BILATERAL HEARING LOSS, UNSPECIFIED HEARING LOSS TYPE: ICD-10-CM

## 2022-03-15 PROCEDURE — 99202 OFFICE O/P NEW SF 15 MIN: CPT | Performed by: NURSE PRACTITIONER

## 2022-03-15 NOTE — PATIENT INSTRUCTIONS
CONTACT INFORMATION:  The main office phone number is [phone]. For emergencies after hours and on weekends, this number will convert over to our answering service and the on call provider will answer. Please try to keep non emergent phone calls/ questions to office hours 9am-5pm Monday through Friday.      [employer]  As an alternative, you can sign up and use the Epic MyChart system for more direct and quicker access for non emergent questions/ problems.  [employer] allows you to send messages to your doctor, view your test results, renew your prescriptions, schedule appointments, and more. To sign up, go to [employer] and click on the Sign Up Now link in the New User? box. Enter your [employer] Activation Code exactly as it appears below along with the last four digits of your Social Security Number and your Date of Birth () to complete the sign-up process. If you do not sign up before the expiration date, you must request a new code.     [employer] Activation Code: Activation code not generated  Current [employer] Status: Active     If you have questions, you can email [email or web address] or call [phone] to talk to our [employer] staff. Remember, [employer] is NOT to be used for urgent needs. For medical emergencies, dial 911.     IF YOU SMOKE OR USE TOBACCO PLEASE READ THE FOLLOWING:  Why is smoking bad for me?  Smoking increases the risk of heart disease, lung disease, vascular disease, stroke, and cancer. If you smoke, STOP!        IF YOU SMOKE OR USE TOBACCO PLEASE READ THE FOLLOWING:  Why is smoking bad for me?  Smoking increases the risk of heart disease, lung disease, vascular disease, stroke, and cancer. If you smoke, STOP!     For more information:  Quit Now [place]  -QUIT-NOW  [email or web address].[email or web address]/en-[email or web address] HEARING LOSS       Hearing loss is the third most common health condition in the [place] affecting more than 1 of every 10 people.  This means that  over 28 million Americans suffer from hearing loss.  The condition varies with age: 1 out of every 25 children, 1 out of every 14 aged 14-44 years old, 1 out of every 7 adults aged 45-65 year old, and 1 out of every 3 adults over the age of 65 years old.    The ear works by receiving sound vibrations, amplifying the sound, and then converting the mechanical vibration into an electrical signal that is sent to the hearing center of the brain.  The ear has three basic parts, each with very specific functions:  The External Ear   This is made up of the parts of the ear you can see (the auricle), and the ear canal.  These structures function to funnel the sound vibrations down into the ear so that they can be processed.  The Middle Ear  The external ear and the middle ear are  by the eardrum (tympanic membrane).  The middle ear is an air-filled space that houses the middle ear bones (ossicles).  Sound waves hit the eardrum and cause it to vibrate.  The vibrations are then transferred to the ear bones that amplify the sound and transfer it to the inner ear (cochlea).  The Inner Ear  The inner ear is a snail-shaped bone that contains a fluid-filled membrane inside another fluid-filled membrane.  It acts like a battery to transform the mechanical vibrations into and electrical signal.  It does this with very delicate hair cells that rest on top of the inner ear membranes.  The electrical signal is then shipped out of the inner ear to the brain where it is processed and understood as sound.    Disease in any one of these parts of the ear can cause hearing loss, but can do so in two different ways.  When there is a problem with the process of bringing the sound to the inner ear (i.e. problems in the external or middle ear) it is called a conductive hearing loss.  Problems in the inner ear or nerves of hearing care called sensorineural hearing losses.  Often times, however, there is a combination of the types of  hearing losses or a mixed hearing loss.  Conductive Hearing Loss  Impairment of the transfer of sound into the inner ear because of problems with the external ear, the eardrum or the middle ear can cause conductive hearing loss.  Conductive losses can often be lessened or cured with medication or surgery, depending on the etiology.  Causes of Conductive Hearing Losses:  Wax occlusion of the ear canal  Eardrum perforations  Stiffened or scarred eardrums  Fluid in the middle earspace (otitis media with effusion)  Middle ear infections (acute otitis media)  Scarring or fixation of the ear bones (tympanosclerosis, otosclerosis)  Dislocation of the ear bones  Cholesteatoma: this is a “skin cyst” that develops due to severely retracted eardrums or from skin growth into the middle ear from a chronic eardrum perforation.  Over time the cyst can grow and erode the bones of hearing.  Middle ear tumors or masses  Sensorineural Hearing Loss  Deficits in hearing due to problems in the inner ear or nerves of hearing are termed sensorineural losses.  These are usually due to damage to the microscopic hair cells in the cochlea, or due to loss of the nerve cells in the hearing nerve.  For the most part, this type of hearing loss cannot be repaired with medication or surgery, except in rare instances.  Often times, however, it can be helped with hearing aids and rarely with cochlear implantation.  This type of loss can also be associated with tinnitus (ringing of the ears) and vertigo (dizziness).  Causes of Sensorineural Hearing Losses:  Damage from noise exposure  Aging:  this is often a slow, progressive loss of the high frequencies  Infection (labrynthitis)  Secondary loss from the effects of meningitis  Genetic/familial related hearing loss  Autoimmune hearing loss (a rheumatoid-like process)  Temporal bond fracture (severe fracture of the bone around or through the inner ear)  Medication induced damage (chemotherapy, high dose  IV antibiotics, diuretics)  Inner ear and skull base tumors (acoustic neuroma, memingioma)        Specific Causes    Ear Infections and Effusion in Children  Any time the middle ear is filled with fluid, as in an active infection or in middle ear effusion, there can be a conductive hearing.  Ear infections are the most common cause of infant hearing loss and are a very treatable type of hearing loss.  Usually the middle ear is filled with air, which allows the bones of hearing to freely move.  When there is fluid in this space it slows the vibration and is literally like trying to hear underwater.  Most of the time this infection or fluid can be treated with medication.  If this does not happen, often myringotomy tube placement can allow for drainage of fluid and allow the middle ear space to remain ventilated.  Congenital Hearing Loss  Hearing loss is the most common birth defect, affecting 3 in every 1000 children born in the [place].  If untreated, this can lead to significant problems in speech, language, and learning.  Recently, most hospitals have early infant screening that can identify newborns with hearing problems.  With accurate diagnosis, these children can often be treated with hearing aids and other forms of treatment that can allow them to develop normal speech and learning.  Noise Induced Hearing Loss  According to the National Institutes of Health, approximately 10 million Americans have hearing losses that are a least partially attributable to loud noise exposure.  Exposures that can cause permanent damage vary, but short exposures of very loud quality (gunfire) can be as bad as longer exposures at lower levels.  Most conversations are at about 65-70 decibels.  Levels over 85dB, with exposures of up to 8 hours a day, will produce permanent hearing loss after many years of exposure.  A stereo headset turned up full blast (about 110 dB) can cause a significant hearing loss in less than a half  an hour.  These losses are due to damage of the hair cells of the inner ear from the excessive vibrational acoustic trauma of the loud noise.  Since this loss is usually nonreversible, hearing protection is essential.  Foam earplugs can provide 15-30dB of noise protection.  Age Related Hearing Loss (Presbyacusis)  Age related hearing loss is another very common form of hearing loss in the elderly.  It is usually a slowly progressive, high frequency sensorineural loss that in nonreversible.  Not all elderly people will have hearing loss as this is very much related to the genetic makeup of the individual.  This can cause the patient to withdraw from social situations, or cause them to seem to have a mood change because of the frustration of not being able to hear a conversation or having to ask people to repeat things.  Most of the time, this can be well treated by amplifying sounds with a hearing aid.    Hearing loss can have a large impact in the way we function on a day to day basis with our environment.  We are fortunate that most hearing loss can be treated by hearing aids or medical and surgical treatments.  If you think you may be suffering from hearing loss, an evaluation by our doctor can help identify the cause and the specific treatment that can help improve your hearing.

## 2022-04-27 ENCOUNTER — PROCEDURE VISIT (OUTPATIENT)
Dept: OTOLARYNGOLOGY | Facility: CLINIC | Age: 87
End: 2022-04-27

## 2022-04-27 DIAGNOSIS — Z96.21 STATUS POST PLACEMENT OF BONE ANCHORED HEARING AID (BAHA): Primary | ICD-10-CM

## 2022-04-27 NOTE — PROGRESS NOTES
AUDIOMETRIC EVALUATION      Name:  Sweetie Treviño  :  1934  Age:  87 y.o.  Date of Evaluation:  2022       History:  Reason for visit:  Patient currently wears a BAHA 5 [employer] Power (SN:[id number]; 2017) and is here today with her granddaughter. Patient reports her BAHA device that cuts on and off. Upon observation, she has a piece of tape holding the battery door in place. While patient was in office, I called cochlear and support spoke to a representative. They informed me the device warranty is out of date. He quoted $25 (before shipping and handling, for $9.95) for a pack of 3 battery doors. He also informed us that we can submit it to insurance to see if they will pay. Patient's granddaughter decided to pay for the battery doors out of pocket. Representative transferred me to the orders department and I spoke with Hilda. We placed an order for the 3 pack of battery doors. They will be shipped to patient's granddaughter's address (verified in chart) -Order #[id number]. I showed the granddaughter how to replace the battery door using patient's device. If she has any issues she can call me and I can either walk her through it or she can bring the device in for me to replace it. If the device is still cutting in and out after the door is replaced, patient's granddaughter will call me. I also told them if they want the BAHA device programmed to the hearing test that was obtained on 2022 [facility], they can schedule an appointment and we can get it reprogrammed.       Rula Christianson, EDWIN  Licensed Audiologist

## 2023-02-08 ENCOUNTER — OFFICE VISIT (OUTPATIENT)
Dept: CARDIOLOGY | Facility: CLINIC | Age: 88
End: 2023-02-08
Payer: MEDICARE

## 2023-02-08 VITALS
HEART RATE: 78 BPM | SYSTOLIC BLOOD PRESSURE: 126 MMHG | OXYGEN SATURATION: 97 % | WEIGHT: 179 LBS | BODY MASS INDEX: 30.56 KG/M2 | HEIGHT: 64 IN | DIASTOLIC BLOOD PRESSURE: 80 MMHG

## 2023-02-08 DIAGNOSIS — I47.1 PAROXYSMAL ATRIAL TACHYCARDIA: Primary | ICD-10-CM

## 2023-02-08 DIAGNOSIS — E66.09 CLASS 1 OBESITY DUE TO EXCESS CALORIES WITH SERIOUS COMORBIDITY AND BODY MASS INDEX (BMI) OF 30.0 TO 30.9 IN ADULT: ICD-10-CM

## 2023-02-08 DIAGNOSIS — I10 ESSENTIAL HYPERTENSION: ICD-10-CM

## 2023-02-08 DIAGNOSIS — E78.5 DYSLIPIDEMIA: ICD-10-CM

## 2023-02-08 DIAGNOSIS — I50.32 CHRONIC DIASTOLIC CONGESTIVE HEART FAILURE: ICD-10-CM

## 2023-02-08 PROCEDURE — 99214 OFFICE O/P EST MOD 30 MIN: CPT | Performed by: INTERNAL MEDICINE

## 2023-02-08 RX ORDER — FOLIC ACID 1 MG/1
1 TABLET ORAL DAILY
COMMUNITY

## 2023-02-08 RX ORDER — MEMANTINE HYDROCHLORIDE 10 MG/1
10 TABLET ORAL 2 TIMES DAILY
COMMUNITY

## 2023-02-08 NOTE — PROGRESS NOTES
Reason for Visit: cardiovascular follow up.    HPI:  Sweetie Treviño is a 88 y.o. female is here today for 1 year follow-up of atrial tachycardia and chronic diastolic CHF.  She was admitted this fall with hypertension.  Adjustments were made in her medications and it is now improving.  She is doing well from a cardiac standpoint.  She denies any chest pain, palpitations, dizziness, syncope, PND, or orthopnea.  She has been gaining weight since living at [facility].  She feels they feed her too much fattening food.  Her COPD is continuing to worsen as well.      Previous Cardiac Testing and Procedures:  - Echo (03/30/2016) mild LVH, EF 60%, grade 2 diastolic dysfunction, mild MR and TR, RVSP 39 mmHg  - Holter monitor (03/30/2016) normal sinus rhythm with rare atrial and ventricular ectopic beats  - Event monitor (09/26/2017) episodes of atrial tachycardia up to 163 beats minute, occasional atrial ventricular ectopic beats, symptoms associated with atrial tachycardia.  - Echo (09/26/2017) EF 55-60%, grade 1 diastolic dysfunction, mild MR and TR  - Echo (6/9/2020) EF 65%, stage I diastolic dysfunction, mild PI    Lab data:  - Lipid panel (5/5/2022) total cholesterol 158, HDL 61, LDL 71, triglycerides 130  - BMP (8/10/2022) creatinine 1.07, GFR 52, potassium 3.8, sodium 136    Patient Active Problem List   Diagnosis   • Chronic diastolic congestive heart failure (HCC)   • Essential hypertension   • COPD (chronic obstructive pulmonary disease) (HCC)   • Class 1 obesity due to excess calories with serious comorbidity and body mass index (BMI) of 30.0 to 30.9 in adult   • GERD (gastroesophageal reflux disease)   • Neuropathy   • Dyslipidemia   • Paroxysmal atrial tachycardia (HCC)   • History of traumatic injury of head       Social History     Tobacco Use   • Smoking status: Former   • Smokeless tobacco: Never   Vaping Use   • Vaping Use: Never used   Substance Use Topics   • Alcohol use: No   • Drug use: No        Family History   Problem Relation Age of Onset   • Heart disease Father    • Emphysema Father    • Kidney failure Mother        The following portions of the patient's history were reviewed and updated as appropriate: allergies, current medications, past family history, past medical history, past social history, past surgical history and problem list.      Current Outpatient Medications:   •  albuterol (ACCUNEB) 1.25 MG/3ML nebulizer solution, Take 1.25 mg by nebulization Take As Directed., Disp: , Rfl:   •  Calcium Carb-Cholecalciferol (CALCIUM 500+D PO), Take  by mouth., Disp: , Rfl:   •  clotrimazole-betamethasone (LOTRISONE) 1-0.05 % lotion, Apply  topically to the appropriate area as directed 2 (Two) Times a Day., Disp: , Rfl:   •  coenzyme Q10 100 MG capsule, Take 100 mg by mouth Daily., Disp: , Rfl:   •  Flaxseed, Linseed, (FLAX SEED OIL PO), Take  by mouth., Disp: , Rfl:   •  folic acid (FOLVITE) 1 MG tablet, Take 1 mg by mouth Daily., Disp: , Rfl:   •  levothyroxine (SYNTHROID, LEVOTHROID) 125 MCG tablet, Take 125 mcg by mouth Daily., Disp: , Rfl:   •  losartan-hydrochlorothiazide (HYZAAR) 100-25 MG per tablet, Take 1 tablet by mouth Daily., Disp: , Rfl:   •  meloxicam (MOBIC) 15 MG tablet, Take 15 mg by mouth Daily., Disp: , Rfl:   •  memantine (NAMENDA) 10 MG tablet, Take 10 mg by mouth 2 (Two) Times a Day., Disp: , Rfl:   •  Misc Natural Products (FIBER 7 PO), Take  by mouth., Disp: , Rfl:   •  Multiple Vitamins-Minerals (VITAMIN D3 COMPLETE PO), Take  by mouth., Disp: , Rfl:   •  multivitamin (THERAGRAN) tablet tablet, Take  by mouth Daily., Disp: , Rfl:   •  NON FORMULARY, Goods Platform, Disp: , Rfl:   •  pantoprazole (PROTONIX) 40 MG EC tablet, Take 40 mg by mouth Daily., Disp: , Rfl:   •  propafenone SR (RYTHMOL SR) 225 MG 12 hr capsule, Take 1 capsule by mouth 2 (Two) Times a Day., Disp: 180 capsule, Rfl: 3  •  propranolol (INDERAL) 10 MG tablet, Take 10 mg by mouth 2 (Two) Times a Day.,  "Disp: , Rfl:   •  rosuvastatin (CRESTOR) 10 MG tablet, Take 10 mg by mouth Every Night., Disp: , Rfl: 1    Review of Systems   Constitutional: Positive for weight gain. Negative for chills and fever.   HENT: Positive for congestion.    Cardiovascular: Positive for dyspnea on exertion. Negative for chest pain and paroxysmal nocturnal dyspnea.   Respiratory: Positive for cough and shortness of breath.    Skin: Negative for rash.   Musculoskeletal: Positive for muscle weakness.   Gastrointestinal: Negative for abdominal pain and heartburn.   Neurological: Positive for disturbances in coordination. Negative for dizziness and numbness.       Objective   /80 (BP Location: Left arm, Patient Position: Sitting, Cuff Size: Adult)   Pulse 78   Ht 162.6 cm (64.02\")   Wt 81.2 kg (179 lb)   SpO2 97%   BMI 30.71 kg/m²   Constitutional:       Appearance: Well-developed. Obese.   HENT:      Head: Normocephalic and atraumatic.   Pulmonary:      Effort: Pulmonary effort is normal.      Breath sounds: Normal breath sounds.   Cardiovascular:      Normal rate. Regular rhythm.      Murmurs: There is no murmur.      No gallop. No click.   Skin:     General: Skin is warm and dry.   Neurological:      Mental Status: Alert and oriented to person, place, and time.       Procedures      ICD-10-CM ICD-9-CM   1. Paroxysmal atrial tachycardia (HCC)  I47.1 427.0   2. Chronic diastolic congestive heart failure (HCC)  I50.32 428.32     428.0   3. Essential hypertension  I10 401.9   4. Class 1 obesity due to excess calories with serious comorbidity and body mass index (BMI) of 30.0 to 30.9 in adult  E66.09 278.00    Z68.30 V85.30   5. Dyslipidemia  E78.5 272.4         Assessment/Plan:  1.  Paroxysmal atrial tachycardia: Heart rate remains regular and she has no symptoms of palpitations.  Continue propafenone and propranolol.       2.  Chronic diastolic congestive heart failure: Grade I diastolic function on echo from 9/2017.  Her breathing " issues appear to be more related to her COPD.  She remains euvolemic on exam.     3.  Essential hypertension: Blood pressure is well controlled today.   Continue propanolol, losartan, and HCTZ.     4.  Obese: BMI is >= 30 and <35. (Class 1 Obesity). The following options were offered after discussion;: exercise counseling/recommendations and nutrition counseling/recommendations.      5.  Dyslipidemia:  Managed on rosuvastatin.
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
## 2023-05-23 ENCOUNTER — TELEPHONE (OUTPATIENT)
Dept: PODIATRY | Facility: CLINIC | Age: 88
End: 2023-05-23
Payer: MEDICARE

## 2023-05-23 NOTE — TELEPHONE ENCOUNTER
Called patient regarding appt on 05/24/2023. Left message for patient to return call if any questions or concerns arise.

## 2023-05-24 ENCOUNTER — OFFICE VISIT (OUTPATIENT)
Dept: PODIATRY | Facility: CLINIC | Age: 88
End: 2023-05-24
Payer: MEDICARE

## 2023-05-24 VITALS
DIASTOLIC BLOOD PRESSURE: 76 MMHG | HEART RATE: 81 BPM | HEIGHT: 64 IN | BODY MASS INDEX: 30.56 KG/M2 | OXYGEN SATURATION: 95 % | SYSTOLIC BLOOD PRESSURE: 120 MMHG | WEIGHT: 179 LBS

## 2023-05-24 DIAGNOSIS — R26.9 GAIT ABNORMALITY: ICD-10-CM

## 2023-05-24 DIAGNOSIS — M79.672 FOOT PAIN, LEFT: Primary | ICD-10-CM

## 2023-05-24 RX ORDER — DICLOFENAC SODIUM 30 MG/G
GEL TOPICAL 4 TIMES DAILY
Qty: 100 G | Refills: 5 | Status: SHIPPED | OUTPATIENT
Start: 2023-05-24

## 2023-05-24 RX ORDER — DICLOFENAC SODIUM 30 MG/G
GEL TOPICAL 4 TIMES DAILY
Qty: 100 G | Refills: 5 | Status: SHIPPED | OUTPATIENT
Start: 2023-05-24 | End: 2023-05-24 | Stop reason: SDUPTHER

## 2024-02-07 ENCOUNTER — OFFICE VISIT (OUTPATIENT)
Dept: CARDIOLOGY | Facility: CLINIC | Age: 89
End: 2024-02-07
Payer: MEDICARE

## 2024-02-07 VITALS
SYSTOLIC BLOOD PRESSURE: 138 MMHG | WEIGHT: 171 LBS | DIASTOLIC BLOOD PRESSURE: 86 MMHG | BODY MASS INDEX: 29.19 KG/M2 | HEART RATE: 70 BPM | OXYGEN SATURATION: 96 % | HEIGHT: 64 IN

## 2024-02-07 DIAGNOSIS — I10 ESSENTIAL HYPERTENSION: ICD-10-CM

## 2024-02-07 DIAGNOSIS — I50.32 CHRONIC DIASTOLIC CONGESTIVE HEART FAILURE: ICD-10-CM

## 2024-02-07 DIAGNOSIS — I47.19 PAROXYSMAL ATRIAL TACHYCARDIA: Primary | ICD-10-CM

## 2024-02-07 DIAGNOSIS — E78.5 DYSLIPIDEMIA: ICD-10-CM

## 2024-02-07 RX ORDER — GABAPENTIN 100 MG/1
100 CAPSULE ORAL 3 TIMES DAILY
COMMUNITY

## 2024-02-07 RX ORDER — BUDESONIDE, GLYCOPYRROLATE, AND FORMOTEROL FUMARATE 160; 9; 4.8 UG/1; UG/1; UG/1
AEROSOL, METERED RESPIRATORY (INHALATION)
COMMUNITY
Start: 2024-01-29

## 2024-02-07 RX ORDER — CETIRIZINE HYDROCHLORIDE 5 MG/1
5 TABLET ORAL DAILY
COMMUNITY

## 2024-02-07 RX ORDER — LOSARTAN POTASSIUM 100 MG/1
100 TABLET ORAL DAILY
COMMUNITY

## 2024-02-07 RX ORDER — GUAIFENESIN 1200 MG/1
TABLET, EXTENDED RELEASE ORAL
COMMUNITY
Start: 2024-01-29

## 2024-02-07 RX ORDER — OLOPATADINE HYDROCHLORIDE 1 MG/ML
SOLUTION/ DROPS OPHTHALMIC
COMMUNITY
Start: 2024-02-05

## 2024-02-07 RX ORDER — ATORVASTATIN CALCIUM 20 MG/1
20 TABLET, FILM COATED ORAL DAILY
COMMUNITY

## 2024-02-07 RX ORDER — DONEPEZIL HYDROCHLORIDE 10 MG/1
10 TABLET, FILM COATED ORAL NIGHTLY
COMMUNITY

## 2024-02-07 RX ORDER — CLONIDINE HYDROCHLORIDE 0.1 MG/1
0.1 TABLET ORAL 2 TIMES DAILY
COMMUNITY

## 2024-02-07 RX ORDER — FAMOTIDINE 40 MG/1
40 TABLET, FILM COATED ORAL DAILY
COMMUNITY

## 2024-02-07 RX ORDER — AMOXICILLIN AND CLAVULANATE POTASSIUM 875; 125 MG/1; MG/1
TABLET, FILM COATED ORAL
COMMUNITY
Start: 2024-01-18

## 2024-02-07 RX ORDER — CLOBETASOL PROPIONATE 0.5 MG/G
OINTMENT TOPICAL
COMMUNITY
Start: 2023-12-05

## 2024-02-07 RX ORDER — PREDNISONE 20 MG/1
TABLET ORAL
COMMUNITY
Start: 2024-01-18

## 2024-02-07 RX ORDER — FLUTICASONE PROPIONATE 50 MCG
SPRAY, SUSPENSION (ML) NASAL
COMMUNITY
Start: 2024-02-05
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
## 2024-02-07 NOTE — LETTER
February 7, 2024     KENNETH Lopez MD  [address] [facility] [address]    Patient: Sweetie Treviño   YOB: 1934   Date of Visit: 2/7/2024       Dear KENNETH Lopez MD    Sweetie Treviño was in my office today. Below is a copy of my note.    If you have questions, please do not hesitate to call me. I look forward to following Sweetie along with you.         Sincerely,        Kendrick Higginbotham MD        CC: No Recipients      Reason for Visit: cardiovascular follow up.    HPI:  Sweetie Treviño is a 89 y.o. female is here today for 1 year follow-up.  She reports doing about the same.  Her hearing has been an issue.  The hearing aids do not seem to work right and she has a ringing in her ears.  She has chronic shortness of breath which she contributes to her COPD.  This appears to be at his baseline.  She denies any chest pain, palpitations, dizziness, syncope, PND, or orthopnea.  Her blood pressure typically runs in the 130 range systolic at home.    Previous Cardiac Testing and Procedures:  - Echo (03/30/2016) mild LVH, EF 60%, grade 2 diastolic dysfunction, mild MR and TR, RVSP 39 mmHg  - Holter monitor (03/30/2016) normal sinus rhythm with rare atrial and ventricular ectopic beats  - Event monitor (09/26/2017) episodes of atrial tachycardia up to 163 beats minute, occasional atrial ventricular ectopic beats, symptoms associated with atrial tachycardia.  - Echo (09/26/2017) EF 55-60%, grade 1 diastolic dysfunction, mild MR and TR  - Echo (6/9/2020) EF 65%, stage I diastolic dysfunction, mild PI     Lab data:  - Lipid panel (5/5/2022) total cholesterol 158, HDL 61, LDL 71, triglycerides 130  - BMP (8/10/2022) creatinine 1.07, GFR 52, potassium 3.8, sodium 136    Patient Active Problem List   Diagnosis   • Chronic diastolic congestive heart failure   • Essential hypertension   • COPD (chronic obstructive pulmonary disease)   • Class 1 obesity due to excess calories with serious comorbidity and body mass index  (BMI) of 30.0 to 30.9 in adult   • GERD (gastroesophageal reflux disease)   • Neuropathy   • Dyslipidemia   • Paroxysmal atrial tachycardia   • History of traumatic injury of head       Social History     Tobacco Use   • Smoking status: Former   • Smokeless tobacco: Never   Vaping Use   • Vaping Use: Never used   Substance Use Topics   • Alcohol use: No   • Drug use: No       Family History   Problem Relation Age of Onset   • Heart disease Father    • Emphysema Father    • Kidney failure Mother        The following portions of the patient's history were reviewed and updated as appropriate: allergies, current medications, past family history, past medical history, past social history, past surgical history, and problem list.      Current Outpatient Medications:   •  albuterol (ACCUNEB) 1.25 MG/3ML nebulizer solution, Take 3 mL by nebulization Take As Directed., Disp: , Rfl:   •  amoxicillin-clavulanate (AUGMENTIN) 875-125 MG per tablet, , Disp: , Rfl:   •  atorvastatin (LIPITOR) 20 MG tablet, Take 1 tablet by mouth Daily., Disp: , Rfl:   •  Breztri Aerosphere 160-9-4.8 MCG/ACT aerosol inhaler, , Disp: , Rfl:   •  Calcium Carb-Cholecalciferol (CALCIUM 500+D PO), Take  by mouth., Disp: , Rfl:   •  cetirizine (zyrTEC) 5 MG tablet, Take 1 tablet by mouth Daily., Disp: , Rfl:   •  Cholecalciferol 125 MCG (5000 UT) tablet, , Disp: , Rfl:   •  clobetasol (TEMOVATE) 0.05 % ointment, , Disp: , Rfl:   •  cloNIDine (CATAPRES) 0.1 MG tablet, Take 1 tablet by mouth 2 (Two) Times a Day., Disp: , Rfl:   •  clotrimazole-betamethasone (LOTRISONE) 1-0.05 % lotion, Apply  topically to the appropriate area as directed 2 (Two) Times a Day., Disp: , Rfl:   •  coenzyme Q10 100 MG capsule, Take 1 capsule by mouth Daily., Disp: , Rfl:   •  Diclofenac Sodium ([employer] Diclofenac Sodium) 1 % gel gel, Apply 4 g topically to the appropriate area as directed 4 (Four) Times a Day As Needed (apply gel to appropriate area (left foot) 4 times daily for 60  days)., Disp: 100 g, Rfl: 5  •  Diclofenac Sodium 3 % gel gel, Apply  topically to the appropriate area as directed 4 (Four) Times a Day. for 60 days., Disp: 100 g, Rfl: 5  •  donepezil (ARICEPT) 10 MG tablet, Take 1 tablet by mouth Every Night., Disp: , Rfl:   •  famotidine (PEPCID) 40 MG tablet, Take 1 tablet by mouth Daily., Disp: , Rfl:   •  Flaxseed, Linseed, (FLAX SEED OIL PO), Take  by mouth., Disp: , Rfl:   •  fluticasone (FLONASE) 50 MCG/ACT nasal spray, , Disp: , Rfl:   •  folic acid (FOLVITE) 1 MG tablet, Take 1 tablet by mouth Daily., Disp: , Rfl:   •  FT Mucus Relief 12HR 1200 MG tablet sustained-release 12 hour, , Disp: , Rfl:   •  gabapentin (NEURONTIN) 100 MG capsule, Take 1 capsule by mouth 3 (Three) Times a Day., Disp: , Rfl:   •  levothyroxine (SYNTHROID, LEVOTHROID) 125 MCG tablet, Take 1 tablet by mouth Daily., Disp: , Rfl:   •  losartan (COZAAR) 100 MG tablet, Take 1 tablet by mouth Daily., Disp: , Rfl:   •  meloxicam (MOBIC) 15 MG tablet, Take 1 tablet by mouth Daily., Disp: , Rfl:   •  memantine (NAMENDA) 10 MG tablet, Take 1 tablet by mouth 2 (Two) Times a Day., Disp: , Rfl:   •  Misc Natural Products (FIBER 7 PO), Take  by mouth., Disp: , Rfl:   •  Multiple Vitamins-Minerals (VITAMIN D3 COMPLETE PO), Take  by mouth., Disp: , Rfl:   •  multivitamin (THERAGRAN) tablet tablet, Take  by mouth Daily., Disp: , Rfl:   •  NON FORMULARY, lmbang, Disp: , Rfl:   •  olopatadine (PATANOL) 0.1 % ophthalmic solution, , Disp: , Rfl:   •  pantoprazole (PROTONIX) 40 MG EC tablet, Take 1 tablet by mouth Daily., Disp: , Rfl:   •  predniSONE (DELTASONE) 20 MG tablet, , Disp: , Rfl:   •  propafenone SR (RYTHMOL SR) 225 MG 12 hr capsule, Take 1 capsule by mouth 2 (Two) Times a Day., Disp: 180 capsule, Rfl: 3  •  propranolol (INDERAL) 20 MG tablet, Take 1 tablet by mouth 2 (Two) Times a Day., Disp: , Rfl:     Review of Systems   Constitutional: Negative for chills and fever.   HENT:  Positive for  "hearing loss.    Cardiovascular:  Negative for chest pain and paroxysmal nocturnal dyspnea.   Respiratory:  Positive for shortness of breath. Negative for cough.    Skin:  Negative for rash.   Gastrointestinal:  Negative for abdominal pain and heartburn.   Neurological:  Negative for dizziness and numbness.       Objective  /86 (BP Location: Right arm, Patient Position: Sitting, Cuff Size: Adult)   Pulse 70   Ht 162.6 cm (64.02\")   Wt 77.6 kg (171 lb)   SpO2 96%   BMI 29.34 kg/m²   Constitutional:       Appearance: Well-developed.   HENT:      Head: Normocephalic and atraumatic.      Right Ear: Decreased hearing noted.      Left Ear: Decreased hearing noted.   Pulmonary:      Effort: Pulmonary effort is normal.      Breath sounds: Normal breath sounds.   Cardiovascular:      Normal rate. Regular rhythm.   Edema:     Peripheral edema absent.   Skin:     General: Skin is warm and dry.   Neurological:      Mental Status: Alert and oriented to person, place, and time.       Procedures      ICD-10-CM ICD-9-CM   1. Paroxysmal atrial tachycardia  I47.19 427.0   2. Chronic diastolic congestive heart failure  I50.32 428.32     428.0   3. Essential hypertension  I10 401.9   4. Dyslipidemia  E78.5 272.4         Assessment/Plan:  1.  Paroxysmal atrial tachycardia: Regular rhythm on exam today.  Continue propafenone and propranolol.       2.  Chronic diastolic congestive heart failure: Grade I diastolic function on echo from 9/2017.  Breathing is at baseline with COPD being the primary cause.  Euvolemic on exam.     3.  Essential hypertension: Blood pressure is acceptable today.  Continue propanolol, clonidine, and losartan.     4.  Dyslipidemia: Now managed on atorvastatin.  "

## 2024-07-03 ENCOUNTER — TELEPHONE (OUTPATIENT)
Dept: CARDIOLOGY | Facility: CLINIC | Age: 89
End: 2024-07-03

## 2024-07-03 NOTE — TELEPHONE ENCOUNTER
Caller: SHEELA OLIVAREZ    Relationship to patient: Emergency Contact    Best call back number: [phone] (home)      Chief complaint: SHORTNESS OF BREAT    Type of visit: FOLLOW UP     Requested date: WEDNESDAYS IF POSSIBLE          Additional notes:PATIENT WOULD LIKE TO BE SEEN SOONER THAN APPOINTMENT IN FEBRUARY DUE TO WORSE SYMPTOMS.

## 2024-07-03 NOTE — TELEPHONE ENCOUNTER
Left message for Carole on voicemail to call and schedule appointment.  Okay to schedule with Dr. Higginbotham or Mariya Sparks.

## 2024-09-04 ENCOUNTER — OFFICE VISIT (OUTPATIENT)
Dept: CARDIOLOGY | Facility: CLINIC | Age: 89
End: 2024-09-04
Payer: MEDICARE

## 2024-09-04 VITALS
WEIGHT: 167 LBS | HEART RATE: 64 BPM | DIASTOLIC BLOOD PRESSURE: 78 MMHG | BODY MASS INDEX: 28.51 KG/M2 | HEIGHT: 64 IN | OXYGEN SATURATION: 98 % | SYSTOLIC BLOOD PRESSURE: 122 MMHG

## 2024-09-04 DIAGNOSIS — I25.10 CORONARY ARTERY DISEASE INVOLVING NATIVE CORONARY ARTERY OF NATIVE HEART, UNSPECIFIED WHETHER ANGINA PRESENT: ICD-10-CM

## 2024-09-04 DIAGNOSIS — I10 ESSENTIAL HYPERTENSION: ICD-10-CM

## 2024-09-04 DIAGNOSIS — E66.3 OVERWEIGHT: ICD-10-CM

## 2024-09-04 DIAGNOSIS — I47.19 PAROXYSMAL ATRIAL TACHYCARDIA: ICD-10-CM

## 2024-09-04 DIAGNOSIS — I50.32 CHRONIC DIASTOLIC CONGESTIVE HEART FAILURE: Primary | ICD-10-CM

## 2024-09-04 DIAGNOSIS — R06.02 SHORTNESS OF BREATH: ICD-10-CM

## 2024-09-04 DIAGNOSIS — R54 ADVANCED AGE: ICD-10-CM

## 2024-09-04 DIAGNOSIS — J44.9 CHRONIC OBSTRUCTIVE PULMONARY DISEASE, UNSPECIFIED COPD TYPE: ICD-10-CM

## 2024-09-04 DIAGNOSIS — E78.5 DYSLIPIDEMIA: ICD-10-CM

## 2024-09-04 LAB
PULSE OX: 92 %
PULSE OX: 98 %

## 2024-09-04 RX ORDER — POLYETHYLENE GLYCOL 3350 17 G/17G
17 POWDER, FOR SOLUTION ORAL DAILY PRN
COMMUNITY

## 2024-09-04 RX ORDER — BISACODYL 5 MG/1
5 TABLET, DELAYED RELEASE ORAL DAILY PRN
COMMUNITY

## 2024-09-04 NOTE — ASSESSMENT & PLAN NOTE
Fair control except slightly elevated TGs and slightly low HDL on atorvastatin. Continue present therapy.
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
## 2024-09-04 NOTE — ASSESSMENT & PLAN NOTE
Suspect multifactorial given advanced age, chronic diastolic heart failure, COPD, mild CAD (per CT), and probable deconditioning. No anemia per recent labs. Check echocardiogram, BNP, and nuclear stress test (they would consider medication changes but probably not heart cath).

## 2024-09-04 NOTE — ASSESSMENT & PLAN NOTE
Suspect SOA is multifactorial. Check echocardiogram and BNP but no overt s/s of heart failure exacerbation. Will consider addition of SGLTi pending results.

## 2024-09-04 NOTE — ASSESSMENT & PLAN NOTE
Patient's (Body mass index is 28.65 kg/m².) indicates that they are overweight with health conditions that include hypertension, coronary heart disease, dyslipidemias, and GERD . Weight is improving with lifestyle modifications. BMI is above average; BMI management plan is completed. Will encourage healthy diet and gradual increase in routine aerobic activity pending result of nuclear stress.

## 2024-09-04 NOTE — ASSESSMENT & PLAN NOTE
SOA is possible anginal equivalent. Check nuclear stress. Consider addition of isosorbide or ranolazine if significant ischemia. They would probably decline cardiac cath given increased risks at her age. Considering addition of SGLTi given CAD and diastolic heart failure but she already has intermittent yeast infection under breasts.

## 2024-09-04 NOTE — Clinical Note
Copy of 8/6/24 labs from [facility] if not already requested and copy of Dr. Tsang's last office note. TX!

## 2024-09-04 NOTE — ASSESSMENT & PLAN NOTE
Pulmonary following. Did not meet criteria for oxygen. Might benefit from physical therapy/pulmonary rehab. Await result of nuclear stress.

## 2024-09-04 NOTE — ASSESSMENT & PLAN NOTE
Reportedly stable on propafenone. Continue present therapy unless significant ischemic burden on nuclear stress.

## 2024-10-02 ENCOUNTER — OUTSIDE FACILITY SERVICE (OUTPATIENT)
Dept: CARDIOLOGY | Facility: CLINIC | Age: 89
End: 2024-10-02
Payer: MEDICARE

## 2024-10-02 DIAGNOSIS — R06.02 SHORTNESS OF BREATH: ICD-10-CM

## 2024-10-02 DIAGNOSIS — I50.32 CHRONIC DIASTOLIC CONGESTIVE HEART FAILURE: ICD-10-CM

## 2024-10-02 NOTE — PROGRESS NOTES
Please notify patient and grand daughter no significant changes on echocardiogram compared to 2020. Her LV EF is 55-60% with grade 1 diastolic dysfunction and mild aortic valve regurgitation. Did they get the BNP lab done? We don't have the result. TX!

## 2024-10-03 DIAGNOSIS — I25.118 CORONARY ARTERY DISEASE OF NATIVE ARTERY OF NATIVE HEART WITH STABLE ANGINA PECTORIS: Primary | Chronic | ICD-10-CM

## 2024-10-03 RX ORDER — RANOLAZINE 500 MG/1
500 TABLET, EXTENDED RELEASE ORAL 2 TIMES DAILY
Qty: 60 TABLET | Refills: 11 | Status: SHIPPED | OUTPATIENT
Start: 2024-10-03 | End: 2025-10-03

## 2024-10-03 NOTE — PROGRESS NOTES
Patients granddaughter, Carole, notified and voiced understanding. Agreed to begin JessicaaKathrin is perfect. Advised to call if unable to tolerate. She v/u

## 2024-10-03 NOTE — PROGRESS NOTES
Please notify patient her nuclear stress test is suggestive of possible significant coronary artery disease. I recommend addition of Ranexa (ranolazine) 500 mg twice a day or Imdur (isosorbide mononitrate) 30 mg daily to see if any improvement in her shortness of breath. In my experience Ranexa helps with SOA more than Imdur but can cause some dizziness. We could consider a heart cath if she and family want to pursue if no improvement in symptoms with medication. We dicussed she would be at higher risk at her age. I sent Rx for Ranexa to [facility]. Call if any intolerable side effects or worsening of her symptoms. Keep scheduled f/u for next month. TX!

## 2024-11-06 ENCOUNTER — OFFICE VISIT (OUTPATIENT)
Dept: CARDIOLOGY | Facility: CLINIC | Age: 89
End: 2024-11-06
Payer: MEDICARE

## 2024-11-06 ENCOUNTER — CLINICAL SUPPORT (OUTPATIENT)
Dept: FAMILY MEDICINE CLINIC | Facility: CLINIC | Age: 89
End: 2024-11-06
Payer: MEDICARE

## 2024-11-06 VITALS
BODY MASS INDEX: 28.34 KG/M2 | HEIGHT: 64 IN | DIASTOLIC BLOOD PRESSURE: 84 MMHG | HEART RATE: 71 BPM | WEIGHT: 166 LBS | OXYGEN SATURATION: 98 % | SYSTOLIC BLOOD PRESSURE: 142 MMHG

## 2024-11-06 DIAGNOSIS — I50.32 CHRONIC DIASTOLIC HEART FAILURE: Primary | ICD-10-CM

## 2024-11-06 DIAGNOSIS — E78.5 DYSLIPIDEMIA: Chronic | ICD-10-CM

## 2024-11-06 DIAGNOSIS — I25.118 CORONARY ARTERY DISEASE INVOLVING NATIVE CORONARY ARTERY OF NATIVE HEART WITH OTHER FORM OF ANGINA PECTORIS: Primary | Chronic | ICD-10-CM

## 2024-11-06 DIAGNOSIS — R54 ADVANCED AGE: ICD-10-CM

## 2024-11-06 DIAGNOSIS — I10 ESSENTIAL HYPERTENSION: Chronic | ICD-10-CM

## 2024-11-06 DIAGNOSIS — I47.19 PAROXYSMAL ATRIAL TACHYCARDIA: Chronic | ICD-10-CM

## 2024-11-06 DIAGNOSIS — I50.32 CHRONIC DIASTOLIC CONGESTIVE HEART FAILURE: Chronic | ICD-10-CM

## 2024-11-06 PROBLEM — E66.3 OVERWEIGHT: Chronic | Status: ACTIVE | Noted: 2024-09-04

## 2024-11-06 PROCEDURE — 36415 COLL VENOUS BLD VENIPUNCTURE: CPT | Performed by: NURSE PRACTITIONER

## 2024-11-06 NOTE — ASSESSMENT & PLAN NOTE
Remains fairly well controlled with low dose propranolol and losartan. Continue present therapy.

## 2024-11-06 NOTE — ASSESSMENT & PLAN NOTE
Suspect SOA is multifactorial and has improved since addition of ranolazine. No change in echocardiogram. BNP pending. No overt s/s of heart failure exacerbation. Considered addition of SGLTi but already has intermittent yeast infections under breasts.

## 2024-11-06 NOTE — ASSESSMENT & PLAN NOTE
Remains stable on propafenone and propranolol. Continue present therapy. No significant ischemic burden on nuclear stress.

## 2024-11-06 NOTE — ASSESSMENT & PLAN NOTE
Fair control except slightly elevated TGs (non-fasting) and slightly low HDL on atorvastatin. Continue present therapy. Encouraged healthy diet and gradual increase in activity (recommended chair exercises).

## 2024-11-06 NOTE — PROGRESS NOTES
"Encounter Date:11/06/2024  Chief Complaint:   Subjective    Sweetie Treviño is a 89 y.o. female who presents to [facility] today for two month cardiac follow-up. She continues to reside at [facility]. She is accompanied by her grand daughter.      Congestive Heart Failure  Associated symptoms include palpitations. Her past medical history is significant for CAD.   Hypertension  Associated symptoms include palpitations.   Coronary Artery Disease  Symptoms include palpitations. Her past medical history is significant for CHF.   Follow-upAssociated symptoms include: palpitations.   Palpitations        HPI       Congestive Heart Failure     Additional comments: Chronic diastolic. SOA improved since last visit. Had BNP drawn earlier today. Sleeps with wedge pillow under mattress without another pillow. No change in echo. No worsening of minimal swelling. No sudden weight gain.             Hypertension     Additional comments: Has been \"fine\" - checked regularly by [facility]. Usually 130s/70-80s.             Coronary Artery Disease     Additional comments: Abnormal nuclear stress. Less SOA since addition of Ranexa.             Follow-up     Additional comments: 2 mo f/u             Palpitations     Additional comments: Hx of atrial tachycardia. No palpitations.           Last edited by Mariya Sparks APRN on 11/6/2024  2:09 PM.        History: Past medical, surgical, family, and social history reviewed.    Outpatient Medications Marked as Taking for the 11/6/24 encounter (Office Visit) with Mariya Sparks APRN   Medication Sig Dispense Refill    albuterol (ACCUNEB) 1.25 MG/3ML nebulizer solution Take 3 mL by nebulization 3 (Three) Times a Day As Needed for Wheezing or Shortness of Air.      Albuterol Sulfate, sensor, 108 (90 Base) MCG/ACT aerosol powder  Inhale 2 puffs Every 4 (Four) Hours As Needed (SOA, wheezing).      atorvastatin (LIPITOR) 20 MG " tablet Take 1 tablet by mouth Daily.      bisacodyl (DULCOLAX) 5 MG EC tablet Take 1 tablet by mouth Daily As Needed for Constipation.      Breztri Aerosphere 160-9-4.8 MCG/ACT aerosol inhaler Inhale 2 puffs 2 (Two) Times a Day.      cetirizine (zyrTEC) 5 MG tablet Take 1 tablet by mouth Daily.      Cholecalciferol 125 MCG (5000 UT) tablet       clobetasol (TEMOVATE) 0.05 % ointment Apply 1 Application topically to the appropriate area as directed Daily.      cloNIDine (CATAPRES) 0.1 MG tablet Take 1 tablet by mouth 2 (Two) Times a Day As Needed for High Blood Pressure.      clotrimazole-betamethasone (LOTRISONE) 1-0.05 % lotion Apply  topically to the appropriate area as directed 2 (Two) Times a Day.      coenzyme Q10 100 MG capsule Take 1 capsule by mouth Daily.      Diclofenac Sodium ([employer] Diclofenac Sodium) 1 % gel gel Apply 4 g topically to the appropriate area as directed 4 (Four) Times a Day As Needed (apply gel to appropriate area (left foot) 4 times daily for 60 days). 100 g 5    donepezil (ARICEPT) 10 MG tablet Take 1 tablet by mouth Every Night.      famotidine (PEPCID) 40 MG tablet Take 1 tablet by mouth Daily.      fluticasone (FLONASE) 50 MCG/ACT nasal spray Administer 2 sprays into the nostril(s) as directed by provider Daily.      folic acid (FOLVITE) 1 MG tablet Take 1 tablet by mouth Daily.      FT Mucus Relief 12HR 1200 MG tablet sustained-release 12 hour Take 1 tablet by mouth 2 (Two) Times a Day.      gabapentin (NEURONTIN) 100 MG capsule Take 1 capsule by mouth 3 (Three) Times a Day. Normally only takes at bedtime      levothyroxine (SYNTHROID, LEVOTHROID) 100 MCG tablet Take 1 tablet by mouth Daily.      losartan (COZAAR) 100 MG tablet Take 1 tablet by mouth Daily.      meloxicam (MOBIC) 15 MG tablet Take 1 tablet by mouth Daily.      memantine (NAMENDA) 10 MG tablet Take 1 tablet by mouth 2 (Two) Times a Day.      Misc Natural Products (FIBER 7 PO) Take  by mouth Daily.      multivitamin  "(THERAGRAN) tablet tablet Take  by mouth Daily.      olopatadine (PATANOL) 0.1 % ophthalmic solution Apply 1 drop to eye(s) as directed by provider 2 (Two) Times a Day.      polyethylene glycol (MiraLax) 17 GM/SCOOP powder Take 17 g by mouth Daily As Needed.      predniSONE (DELTASONE) 20 MG tablet  (Patient taking differently: Take  by mouth. As needed for AE COPD)      propafenone SR (RYTHMOL SR) 225 MG 12 hr capsule Take 1 capsule by mouth 2 (Two) Times a Day. 180 capsule 3    propranolol (INDERAL) 20 MG tablet Take 1 tablet by mouth 2 (Two) Times a Day.      ranolazine (Ranexa) 500 MG 12 hr tablet Take 1 tablet by mouth 2 (Two) Times a Day. 60 tablet 11        Objective     Vital Signs:   /84 (BP Location: Left arm, Patient Position: Sitting, Cuff Size: Adult)   Pulse 71   Ht 162.6 cm (64.02\")   Wt 75.3 kg (166 lb)   SpO2 98%   BMI 28.48 kg/m²   Wt Readings from Last 3 Encounters:   11/06/24 75.3 kg (166 lb)   09/04/24 75.8 kg (167 lb)   02/07/24 77.6 kg (171 lb)         Vitals reviewed.   Constitutional:       Appearance: Well-developed, well-groomed, overweight and not in distress. Frail. Chronically ill-appearing.   Eyes:      General: No scleral icterus.     Comments: Left eyelid stays partially closed   HENT:      Right Ear: Decreased hearing noted.      Left Ear: Decreased hearing noted.   Neck:      Vascular: No JVD.      Comments: thick  Pulmonary:      Effort: Pulmonary effort is normal.      Breath sounds: Normal breath sounds. Decreased air movement present. No wheezing. No rales.   Cardiovascular:      Normal rate. Regular rhythm.      No gallop.    Pulses:     Intact distal pulses.   Edema:     Peripheral edema absent.   Skin:     General: Skin is warm and dry.   Neurological:      Mental Status: Alert.      Gait: Gait abnormal (walks with rollator).   Psychiatric:         Behavior: Behavior is cooperative.         Cognition and Memory: Cognition normal. Exhibits impaired recent memory. "         Result Review  Data Reviewed:  The following data was reviewed by: MAC [place] on 11/06/2024  BNP pending  Scan on 10/2/2024 1256 by Juliet James MA: ECHO/ [facility]/ 10-2-2024 Stress Test With Myocardial Perfusion One Day (10/02/2024 16:22)   Scan on 9/9/2024 1711 by Mariya Sparks APRN: PTH/T4/TSH/LIPID/CMP/VITD/B12/A1C/UA/ [facility]/ 8-6-2024                  Assessment and Plan   Problem List Items Addressed This Visit          Cardiac and Vasculature    Coronary artery disease - Primary (Chronic)    Overview     mild per CTA chest  10/2/2024 nuclear - inferior, apical resting diffusion defect but normal with stress, septal dyskinesia          Current Assessment & Plan     Symptoms improved with addition of ranolazine without side effects. Continue present therapy. She and family do not wish to pursue invasive treatments given advanced age. Again considered addition of SGLTi given CAD and diastolic heart failure but declined due to chronic intermittent yeast under breasts and pill burden.         Chronic diastolic congestive heart failure (Chronic)    Current Assessment & Plan     Suspect SOA is multifactorial and has improved since addition of ranolazine. No change in echocardiogram. BNP pending. No overt s/s of heart failure exacerbation. Considered addition of SGLTi but already has intermittent yeast infections under breasts.             Dyslipidemia (Chronic)    Overview     8/6/24 , , HDL 49, LDL 72         Current Assessment & Plan     Fair control except slightly elevated TGs (non-fasting) and slightly low HDL on atorvastatin. Continue present therapy. Encouraged healthy diet and gradual increase in activity (recommended chair exercises).          Essential hypertension (Chronic)    Current Assessment & Plan     Remains fairly well controlled with low dose propranolol and losartan. Continue present therapy.           Paroxysmal atrial tachycardia (Chronic)    Current  Assessment & Plan     Remains stable on propafenone and propranolol. Continue present therapy. No significant ischemic burden on nuclear stress.            Symptoms and Signs    Advanced age (Chronic)    Current Assessment & Plan     Encourage safety.          Patient was given instructions and counseling regarding her condition or for health maintenance advice. Please see specific information pulled into the AVS if appropriate.    Follow Up :   Return in about 3 months (around 2/6/2025) for Next scheduled follow up with Dr. Higginbotham.                  Mariya Sparks, APRN, ACNP-BC, CHFN-BC

## 2024-11-06 NOTE — ASSESSMENT & PLAN NOTE
Symptoms improved with addition of ranolazine without side effects. Continue present therapy. She and family do not wish to pursue invasive treatments given advanced age. Again considered addition of SGLTi given CAD and diastolic heart failure but declined due to chronic intermittent yeast under breasts and pill burden.

## 2024-11-06 NOTE — PROGRESS NOTES
Venipuncture Blood Specimen Collection  Venipuncture performed in [facility] by Eri Guzman MA with good hemostasis. Patient tolerated the procedure well without complications.   11/06/24   Eri Guzman MA

## 2024-11-06 NOTE — ASSESSMENT & PLAN NOTE
Patient's (Body mass index is 28.48 kg/m².) indicates that they are overweight with health conditions that include hypertension, coronary heart disease, dyslipidemias, and GERD . Weight is unchanged. BMI is above average; BMI management plan is completed. Encourage healthy diet and gradual increase in routine aerobic activity .

## 2024-11-07 LAB — NT-PROBNP SERPL-MCNC: 456 PG/ML (ref 0–738)

## 2024-11-07 NOTE — PROGRESS NOTES
Please notify patient and grand daughter that her NT proBNP is normal for her age which indicates no congestive heart failure as the cause of her shortness of breath. TX!

## 2025-02-12 ENCOUNTER — OFFICE VISIT (OUTPATIENT)
Dept: CARDIOLOGY | Facility: CLINIC | Age: OVER 89
End: 2025-02-12
Payer: MEDICARE

## 2025-02-12 VITALS
SYSTOLIC BLOOD PRESSURE: 171 MMHG | HEIGHT: 64 IN | DIASTOLIC BLOOD PRESSURE: 86 MMHG | OXYGEN SATURATION: 98 % | WEIGHT: 165 LBS | BODY MASS INDEX: 28.17 KG/M2 | HEART RATE: 68 BPM

## 2025-02-12 DIAGNOSIS — R94.39 ABNORMAL NUCLEAR STRESS TEST: ICD-10-CM

## 2025-02-12 DIAGNOSIS — I47.19 PAROXYSMAL ATRIAL TACHYCARDIA: Primary | Chronic | ICD-10-CM

## 2025-02-12 DIAGNOSIS — E78.5 DYSLIPIDEMIA: Chronic | ICD-10-CM

## 2025-02-12 DIAGNOSIS — R06.09 DYSPNEA ON EXERTION: ICD-10-CM

## 2025-02-12 DIAGNOSIS — I10 ESSENTIAL HYPERTENSION: Chronic | ICD-10-CM

## 2025-02-12 DIAGNOSIS — I51.89 DIASTOLIC DYSFUNCTION: ICD-10-CM

## 2025-02-12 PROBLEM — I25.10 CORONARY ARTERY DISEASE: Chronic | Status: RESOLVED | Noted: 2022-03-01 | Resolved: 2025-02-12

## 2025-02-12 NOTE — LETTER
February 12, 2025     KENNETH Lopez MD  [address] [facility] [address]    Patient: Sweetie Treviño   YOB: 1934   Date of Visit: 2/12/2025       Dear KENNETH Lopez MD    Sweetie Treviño was in my office today. Below is a copy of my note.    If you have questions, please do not hesitate to call me. I look forward to following Sweetie along with you.         Sincerely,        Kendrick Higginbotham MD        CC: No Recipients      Reason for Visit: cardiovascular follow up.    HPI:  Sweetie Treviño is a 89 y.o. female is here today for follow-up.  She remains short of breath.  She has noted this worsening over the last 6 to 9 months.  She was seen in cardiology clinic by MAC Grier on 9/4/2024 and 11/6/2024 for this.  Cardiac testing including echo, nuclear stress test, and proBNP did not reveal etiology for her dyspnea.  This happens when she is talking or moving.  She does chair aerobics for exercise at her assisted living.  She reports blood pressure is usually normal when checked at home.    Previous Cardiac Testing and Procedures:  - Echo (03/30/2016) mild LVH, EF 60%, grade 2 diastolic dysfunction, mild MR and TR, RVSP 39 mmHg  - Holter monitor (03/30/2016) normal sinus rhythm with rare atrial and ventricular ectopic beats  - Event monitor (09/26/2017) episodes of atrial tachycardia up to 163 beats minute, occasional atrial ventricular ectopic beats, symptoms associated with atrial tachycardia.  - Echo (09/26/2017) EF 55-60%, grade 1 diastolic dysfunction, mild MR and TR  - Echo (6/9/2020) EF 65%, stage I diastolic dysfunction, mild PI  - CT chest (3/18/2022) mild coronary artery disease, no pulmonary emboli, mild bilateral lower lobe atelectasis  - Echo (10/2/2024) EF 55-60%, grade 1 diastolic dysfunction, mild AI, normal RV size and function  - Nuclear stress (10/2/2024) inferior and apical defect on rest with normal perfusion on stress, hypokinesis of inferior and septum, EF 80%     Lab  data:  - Lipid panel (2022) total cholesterol 158, HDL 61, LDL 71, triglycerides 130  - BMP (8/10/2022) creatinine 1.07, GFR 52, potassium 3.8, sodium 136  - Lipid panel (2024) total cholesterol 158, HDL 49, LDL 72, triglycerides 184  - proBNP (2024) 456, normal 0-1900    Patient Active Problem List   Diagnosis   • Diastolic dysfunction   • Essential hypertension   • COPD (chronic obstructive pulmonary disease)   • GERD (gastroesophageal reflux disease)   • Neuropathy   • Dyslipidemia   • Paroxysmal atrial tachycardia   • History of traumatic injury of head   • Shortness of breath   • Overweight   • Advanced age       Social History     Tobacco Use   • Smoking status: Former     Current packs/day: 0.00     Average packs/day: 0.5 packs/day for 16.0 years (8.0 ttl pk-yrs)     Types: Cigarettes     Start date:      Quit date:      Years since quittin.1     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)   • Smokeless tobacco: Never   Vaping Use   • Vaping status: Never Used   Substance Use Topics   • Alcohol use: Not Currently     Comment: drank some when she was younger, drank more heavily in her 20-30s   • Drug use: Never       Family History   Problem Relation Age of Onset   • Kidney failure Mother    • Heart disease Father    • Emphysema Father    • Cancer Brother        The following portions of the patient's history were reviewed and updated as appropriate: allergies, current medications, past family history, past medical history, past social history, past surgical history, and problem list.      Current Outpatient Medications:   •  albuterol (ACCUNEB) 1.25 MG/3ML nebulizer solution, Take 3 mL by nebulization 3 (Three) Times a Day As Needed for Wheezing or Shortness of Air., Disp: , Rfl:   •  Albuterol Sulfate, sensor, 108 (90 Base) MCG/ACT aerosol powder , Inhale 2 puffs Every 4 (Four) Hours As Needed (SOA, wheezing)., Disp: , Rfl:   •  amoxicillin-clavulanate (AUGMENTIN) 875-125 MG  per tablet, As needed for AE COPD, Disp: , Rfl:   •  atorvastatin (LIPITOR) 20 MG tablet, Take 1 tablet by mouth Daily., Disp: , Rfl:   •  bisacodyl (DULCOLAX) 5 MG EC tablet, Take 1 tablet by mouth Daily As Needed for Constipation., Disp: , Rfl:   •  Breztri Aerosphere 160-9-4.8 MCG/ACT aerosol inhaler, Inhale 2 puffs 2 (Two) Times a Day., Disp: , Rfl:   •  Calcium Carb-Cholecalciferol (CALCIUM 500+D PO), Take  by mouth Daily., Disp: , Rfl:   •  cetirizine (zyrTEC) 5 MG tablet, Take 1 tablet by mouth Daily., Disp: , Rfl:   •  Cholecalciferol 125 MCG (5000 UT) tablet, , Disp: , Rfl:   •  clobetasol (TEMOVATE) 0.05 % ointment, Apply 1 Application topically to the appropriate area as directed Daily., Disp: , Rfl:   •  cloNIDine (CATAPRES) 0.1 MG tablet, Take 1 tablet by mouth 2 (Two) Times a Day As Needed for High Blood Pressure., Disp: , Rfl:   •  clotrimazole-betamethasone (LOTRISONE) 1-0.05 % lotion, Apply  topically to the appropriate area as directed 2 (Two) Times a Day., Disp: , Rfl:   •  coenzyme Q10 100 MG capsule, Take 1 capsule by mouth Daily., Disp: , Rfl:   •  Diclofenac Sodium ([employer] Diclofenac Sodium) 1 % gel gel, Apply 4 g topically to the appropriate area as directed 4 (Four) Times a Day As Needed (apply gel to appropriate area (left foot) 4 times daily for 60 days)., Disp: 100 g, Rfl: 5  •  donepezil (ARICEPT) 10 MG tablet, Take 1 tablet by mouth Every Night., Disp: , Rfl:   •  famotidine (PEPCID) 40 MG tablet, Take 1 tablet by mouth Daily., Disp: , Rfl:   •  fluticasone (FLONASE) 50 MCG/ACT nasal spray, Administer 2 sprays into the nostril(s) as directed by provider Daily., Disp: , Rfl:   •  folic acid (FOLVITE) 1 MG tablet, Take 1 tablet by mouth Daily., Disp: , Rfl:   •  FT Mucus Relief 12HR 1200 MG tablet sustained-release 12 hour, Take 1 tablet by mouth 2 (Two) Times a Day., Disp: , Rfl:   •  gabapentin (NEURONTIN) 100 MG capsule, Take 1 capsule by mouth 3 (Three) Times a Day. Normally only takes  "at bedtime, Disp: , Rfl:   •  levothyroxine (SYNTHROID, LEVOTHROID) 100 MCG tablet, Take 1 tablet by mouth Daily., Disp: , Rfl:   •  losartan (COZAAR) 100 MG tablet, Take 1 tablet by mouth Daily., Disp: , Rfl:   •  meloxicam (MOBIC) 15 MG tablet, Take 1 tablet by mouth Daily., Disp: , Rfl:   •  memantine (NAMENDA) 10 MG tablet, Take 1 tablet by mouth 2 (Two) Times a Day., Disp: , Rfl:   •  Misc Natural Products (FIBER 7 PO), Take  by mouth Daily., Disp: , Rfl:   •  multivitamin (THERAGRAN) tablet tablet, Take  by mouth Daily., Disp: , Rfl:   •  olopatadine (PATANOL) 0.1 % ophthalmic solution, Apply 1 drop to eye(s) as directed by provider 2 (Two) Times a Day., Disp: , Rfl:   •  polyethylene glycol (MiraLax) 17 GM/SCOOP powder, Take 17 g by mouth Daily As Needed., Disp: , Rfl:   •  predniSONE (DELTASONE) 20 MG tablet, , Disp: , Rfl:   •  propafenone SR (RYTHMOL SR) 225 MG 12 hr capsule, Take 1 capsule by mouth 2 (Two) Times a Day., Disp: 180 capsule, Rfl: 3  •  propranolol (INDERAL) 20 MG tablet, Take 1 tablet by mouth 2 (Two) Times a Day., Disp: , Rfl:   •  ranolazine (Ranexa) 500 MG 12 hr tablet, Take 1 tablet by mouth 2 (Two) Times a Day., Disp: 60 tablet, Rfl: 11    Review of Systems   Constitutional: Negative for chills and fever.   HENT:  Positive for hearing loss.    Cardiovascular:  Positive for dyspnea on exertion. Negative for chest pain and paroxysmal nocturnal dyspnea.   Respiratory:  Positive for shortness of breath. Negative for cough.    Skin:  Negative for rash.   Gastrointestinal:  Positive for change in bowel habit and constipation. Negative for abdominal pain and heartburn.   Neurological:  Positive for numbness and paresthesias. Negative for dizziness.       Objective  /86 (BP Location: Left arm, Patient Position: Sitting, Cuff Size: Adult)   Pulse 68   Ht 162.6 cm (64.02\")   Wt 74.8 kg (165 lb)   SpO2 98%   BMI 28.31 kg/m²   Constitutional:       Appearance: Well-developed. Frail. " "  HENT:      Head: Normocephalic and atraumatic.      Right Ear: Decreased hearing noted.      Left Ear: Decreased hearing noted.   Pulmonary:      Effort: Pulmonary effort is normal.      Breath sounds: Normal breath sounds.   Cardiovascular:      Normal rate. Regular rhythm.   Edema:     Peripheral edema absent.   Skin:     General: Skin is warm and dry.   Neurological:      Mental Status: Alert and oriented to person, place, and time.       Procedures      ICD-10-CM ICD-9-CM   1. Paroxysmal atrial tachycardia  I47.19 427.0   2. Diastolic dysfunction  I51.89 429.9   3. Essential hypertension  I10 401.9   4. Dyslipidemia  E78.5 272.4   5. Abnormal nuclear stress test  R94.39 794.39   6. Dyspnea on exertion  R06.09 786.09         Assessment/Plan:  1.  Paroxysmal atrial tachycardia: Heart rhythm remains regular with no significant palpitations.  Continue propafenone and propranolol.       2.  Diastolic dysfunction: Grade 1 diastolic dysfunction on echo on 10/2/2024 with normal proBNP.  No current evidence of CHF.       3.  Essential hypertension: Blood pressure is significantly elevated today but usually well-controlled at home.  Continue propanolol and losartan.     4.  Dyslipidemia: Lipid panel on 8/6/2024 showed good control as it is and mildly elevated triglycerides.  Continue atorvastatin.    5.  Abnormal nuclear stress: Nuclear stress on 10/2/2024 noted \"reverse redistribution\" with an inferior and apical defect o at n rest and normal perfusion on stress imaging.  In a study by TIFFANIE James et al. a reversible perfusion pattern is a poor predictor of flow-limiting coronary artery disease and does not correlate with stenosis location.    6.  Dyspnea on exertion: No evidence of any significant cardiac etiology based on recent echo, nuclear stress, and proBNP.  Felt to be due to COPD, advanced age, and deconditioning.  "

## 2025-02-12 NOTE — PROGRESS NOTES
Reason for Visit: cardiovascular follow up.    HPI:  Sweetie Treviño is a 89 y.o. female is here today for follow-up.  She remains short of breath.  She has noted this worsening over the last 6 to 9 months.  She was seen in cardiology clinic by [facility] on 9/4/2024 and 11/6/2024 for this.  Cardiac testing including echo, nuclear stress test, and proBNP did not reveal etiology for her dyspnea.  This happens when she is talking or moving.  She does chair aerobics for exercise at her assisted living.  She reports blood pressure is usually normal when checked at home.    Previous Cardiac Testing and Procedures:  - Echo (03/30/2016) mild LVH, EF 60%, grade 2 diastolic dysfunction, mild MR and TR, RVSP 39 mmHg  - Holter monitor (03/30/2016) normal sinus rhythm with rare atrial and ventricular ectopic beats  - Event monitor (09/26/2017) episodes of atrial tachycardia up to 163 beats minute, occasional atrial ventricular ectopic beats, symptoms associated with atrial tachycardia.  - Echo (09/26/2017) EF 55-60%, grade 1 diastolic dysfunction, mild MR and TR  - Echo (6/9/2020) EF 65%, stage I diastolic dysfunction, mild PI  - CT chest (3/18/2022) mild coronary artery disease, no pulmonary emboli, mild bilateral lower lobe atelectasis  - Echo (10/2/2024) EF 55-60%, grade 1 diastolic dysfunction, mild AI, normal RV size and function  - Nuclear stress (10/2/2024) inferior and apical defect on rest with normal perfusion on stress, hypokinesis of inferior and septum, EF 80%     Lab data:  - Lipid panel (5/5/2022) total cholesterol 158, HDL 61, LDL 71, triglycerides 130  - BMP (8/10/2022) creatinine 1.07, GFR 52, potassium 3.8, sodium 136  - Lipid panel (8/6/2024) total cholesterol 158, HDL 49, LDL 72, triglycerides 184  - proBNP (11/6/2024) 456, normal 0-1900    Patient Active Problem List   Diagnosis    Diastolic dysfunction    Essential hypertension    COPD (chronic obstructive pulmonary disease)    GERD  (gastroesophageal reflux disease)    Neuropathy    Dyslipidemia    Paroxysmal atrial tachycardia    History of traumatic injury of head    Shortness of breath    Overweight    Advanced age       Social History     Tobacco Use    Smoking status: Former     Current packs/day: 0.00     Average packs/day: 0.5 packs/day for 16.0 years (8.0 ttl pk-yrs)     Types: Cigarettes     Start date:      Quit date:      Years since quittin.1     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      Current Outpatient Medications:     albuterol (ACCUNEB) 1.25 MG/3ML nebulizer solution, Take 3 mL by nebulization 3 (Three) Times a Day As Needed for Wheezing or Shortness of Air., Disp: , Rfl:     Albuterol Sulfate, sensor, 108 (90 Base) MCG/ACT aerosol powder , Inhale 2 puffs Every 4 (Four) Hours As Needed (SOA, wheezing)., Disp: , Rfl:     amoxicillin-clavulanate (AUGMENTIN) 875-125 MG per tablet, As needed for AE COPD, Disp: , Rfl:     atorvastatin (LIPITOR) 20 MG tablet, Take 1 tablet by mouth Daily., Disp: , Rfl:     bisacodyl (DULCOLAX) 5 MG EC tablet, Take 1 tablet by mouth Daily As Needed for Constipation., Disp: , Rfl:     Breztri Aerosphere 160-9-4.8 MCG/ACT aerosol inhaler, Inhale 2 puffs 2 (Two) Times a Day., Disp: , Rfl:     Calcium Carb-Cholecalciferol (CALCIUM 500+D PO), Take  by mouth Daily., Disp: , Rfl:      cetirizine (zyrTEC) 5 MG tablet, Take 1 tablet by mouth Daily., Disp: , Rfl:     Cholecalciferol 125 MCG (5000 UT) tablet, , Disp: , Rfl:     clobetasol (TEMOVATE) 0.05 % ointment, Apply 1 Application topically to the appropriate area as directed Daily., Disp: , Rfl:     cloNIDine (CATAPRES) 0.1 MG tablet, Take 1 tablet by mouth 2 (Two) Times a Day As Needed for High Blood Pressure., Disp: , Rfl:     clotrimazole-betamethasone (LOTRISONE) 1-0.05 % lotion, Apply  topically to the appropriate area as directed 2 (Two) Times a Day., Disp: , Rfl:     coenzyme Q10 100 MG capsule, Take 1 capsule by mouth Daily., Disp: , Rfl:     Diclofenac Sodium ([employer] Diclofenac Sodium) 1 % gel gel, Apply 4 g topically to the appropriate area as directed 4 (Four) Times a Day As Needed (apply gel to appropriate area (left foot) 4 times daily for 60 days)., Disp: 100 g, Rfl: 5    donepezil (ARICEPT) 10 MG tablet, Take 1 tablet by mouth Every Night., Disp: , Rfl:     famotidine (PEPCID) 40 MG tablet, Take 1 tablet by mouth Daily., Disp: , Rfl:     fluticasone (FLONASE) 50 MCG/ACT nasal spray, Administer 2 sprays into the nostril(s) as directed by provider Daily., Disp: , Rfl:     folic acid (FOLVITE) 1 MG tablet, Take 1 tablet by mouth Daily., Disp: , Rfl:     FT Mucus Relief 12HR 1200 MG tablet sustained-release 12 hour, Take 1 tablet by mouth 2 (Two) Times a Day., Disp: , Rfl:     gabapentin (NEURONTIN) 100 MG capsule, Take 1 capsule by mouth 3 (Three) Times a Day. Normally only takes at bedtime, Disp: , Rfl:     levothyroxine (SYNTHROID, LEVOTHROID) 100 MCG tablet, Take 1 tablet by mouth Daily., Disp: , Rfl:     losartan (COZAAR) 100 MG tablet, Take 1 tablet by mouth Daily., Disp: , Rfl:     meloxicam (MOBIC) 15 MG tablet, Take 1 tablet by mouth Daily., Disp: , Rfl:     memantine (NAMENDA) 10 MG tablet, Take 1 tablet by mouth 2 (Two) Times a Day., Disp: , Rfl:     Misc Natural Products (FIBER 7 PO), Take  by mouth Daily., Disp: , Rfl:      "multivitamin (THERAGRAN) tablet tablet, Take  by mouth Daily., Disp: , Rfl:     olopatadine (PATANOL) 0.1 % ophthalmic solution, Apply 1 drop to eye(s) as directed by provider 2 (Two) Times a Day., Disp: , Rfl:     polyethylene glycol (MiraLax) 17 GM/SCOOP powder, Take 17 g by mouth Daily As Needed., Disp: , Rfl:     predniSONE (DELTASONE) 20 MG tablet, , Disp: , Rfl:     propafenone SR (RYTHMOL SR) 225 MG 12 hr capsule, Take 1 capsule by mouth 2 (Two) Times a Day., Disp: 180 capsule, Rfl: 3    propranolol (INDERAL) 20 MG tablet, Take 1 tablet by mouth 2 (Two) Times a Day., Disp: , Rfl:     ranolazine (Ranexa) 500 MG 12 hr tablet, Take 1 tablet by mouth 2 (Two) Times a Day., Disp: 60 tablet, Rfl: 11    Review of Systems   Constitutional: Negative for chills and fever.   HENT:  Positive for hearing loss.    Cardiovascular:  Positive for dyspnea on exertion. Negative for chest pain and paroxysmal nocturnal dyspnea.   Respiratory:  Positive for shortness of breath. Negative for cough.    Skin:  Negative for rash.   Gastrointestinal:  Positive for change in bowel habit and constipation. Negative for abdominal pain and heartburn.   Neurological:  Positive for numbness and paresthesias. Negative for dizziness.       Objective   /86 (BP Location: Left arm, Patient Position: Sitting, Cuff Size: Adult)   Pulse 68   Ht 162.6 cm (64.02\")   Wt 74.8 kg (165 lb)   SpO2 98%   BMI 28.31 kg/m²   Constitutional:       Appearance: Well-developed. Frail.   HENT:      Head: Normocephalic and atraumatic.      Right Ear: Decreased hearing noted.      Left Ear: Decreased hearing noted.   Pulmonary:      Effort: Pulmonary effort is normal.      Breath sounds: Normal breath sounds.   Cardiovascular:      Normal rate. Regular rhythm.   Edema:     Peripheral edema absent.   Skin:     General: Skin is warm and dry.   Neurological:      Mental Status: Alert and oriented to person, place, and time.       Procedures      ICD-10-CM " "ICD-9-CM   1. Paroxysmal atrial tachycardia  I47.19 427.0   2. Diastolic dysfunction  I51.89 429.9   3. Essential hypertension  I10 401.9   4. Dyslipidemia  E78.5 272.4   5. Abnormal nuclear stress test  R94.39 794.39   6. Dyspnea on exertion  R06.09 786.09         Assessment/Plan:  1.  Paroxysmal atrial tachycardia: Heart rhythm remains regular with no significant palpitations.  Continue propafenone and propranolol.       2.  Diastolic dysfunction: Grade 1 diastolic dysfunction on echo on 10/2/2024 with normal proBNP.  No current evidence of CHF.       3.  Essential hypertension: Blood pressure is significantly elevated today but usually well-controlled at home.  Continue propanolol and losartan.     4.  Dyslipidemia: Lipid panel on 8/6/2024 showed good control as it is and mildly elevated triglycerides.  Continue atorvastatin.    5.  Abnormal nuclear stress: Nuclear stress on 10/2/2024 noted \"reverse redistribution\" with an inferior and apical defect o at n rest and normal perfusion on stress imaging.  In a study by TIFFANIE James et al. a reversible perfusion pattern is a poor predictor of flow-limiting coronary artery disease and does not correlate with stenosis location.    6.  Dyspnea on exertion: No evidence of any significant cardiac etiology based on recent echo, nuclear stress, and proBNP.  Felt to be due to COPD, advanced age, and deconditioning.  "